# Patient Record
Sex: FEMALE | Race: BLACK OR AFRICAN AMERICAN | Employment: FULL TIME | URBAN - METROPOLITAN AREA
[De-identification: names, ages, dates, MRNs, and addresses within clinical notes are randomized per-mention and may not be internally consistent; named-entity substitution may affect disease eponyms.]

---

## 2017-02-04 RX ORDER — CETIRIZINE HYDROCHLORIDE 10 MG/1
TABLET ORAL
Qty: 90 TABLET | Refills: 0 | Status: SHIPPED | OUTPATIENT
Start: 2017-02-04 | End: 2017-05-11 | Stop reason: SDUPTHER

## 2017-08-15 DIAGNOSIS — J01.10 ACUTE FRONTAL SINUSITIS, RECURRENCE NOT SPECIFIED: ICD-10-CM

## 2017-08-15 RX ORDER — AZELASTINE 1 MG/ML
2 SPRAY, METERED NASAL 2 TIMES DAILY
Qty: 1 BOTTLE | Refills: 3 | Status: SHIPPED | OUTPATIENT
Start: 2017-08-15 | End: 2018-09-12 | Stop reason: SDUPTHER

## 2017-08-22 ENCOUNTER — TELEPHONE (OUTPATIENT)
Dept: PRIMARY CARE CLINIC | Age: 62
End: 2017-08-22

## 2017-08-24 ENCOUNTER — OFFICE VISIT (OUTPATIENT)
Dept: PRIMARY CARE CLINIC | Age: 62
End: 2017-08-24

## 2017-08-24 VITALS
BODY MASS INDEX: 50.02 KG/M2 | SYSTOLIC BLOOD PRESSURE: 158 MMHG | DIASTOLIC BLOOD PRESSURE: 81 MMHG | WEIGHT: 293 LBS | HEART RATE: 69 BPM | TEMPERATURE: 97.4 F | RESPIRATION RATE: 18 BRPM | HEIGHT: 64 IN | OXYGEN SATURATION: 97 %

## 2017-08-24 DIAGNOSIS — R51.9 ACUTE NONINTRACTABLE HEADACHE, UNSPECIFIED HEADACHE TYPE: ICD-10-CM

## 2017-08-24 DIAGNOSIS — R51.9 ACUTE NONINTRACTABLE HEADACHE, UNSPECIFIED HEADACHE TYPE: Primary | ICD-10-CM

## 2017-08-24 LAB
A/G RATIO: 1.3 (ref 1.1–2.2)
ALBUMIN SERPL-MCNC: 4.3 G/DL (ref 3.4–5)
ALP BLD-CCNC: 86 U/L (ref 40–129)
ALT SERPL-CCNC: 11 U/L (ref 10–40)
ANION GAP SERPL CALCULATED.3IONS-SCNC: 15 MMOL/L (ref 3–16)
AST SERPL-CCNC: 11 U/L (ref 15–37)
BILIRUB SERPL-MCNC: 0.3 MG/DL (ref 0–1)
BUN BLDV-MCNC: 12 MG/DL (ref 7–20)
CALCIUM SERPL-MCNC: 9.8 MG/DL (ref 8.3–10.6)
CHLORIDE BLD-SCNC: 103 MMOL/L (ref 99–110)
CO2: 25 MMOL/L (ref 21–32)
CREAT SERPL-MCNC: 0.7 MG/DL (ref 0.6–1.2)
GFR AFRICAN AMERICAN: >60
GFR NON-AFRICAN AMERICAN: >60
GLOBULIN: 3.2 G/DL
GLUCOSE BLD-MCNC: 124 MG/DL (ref 70–99)
HCT VFR BLD CALC: 42 % (ref 36–48)
HEMOGLOBIN: 13.5 G/DL (ref 12–16)
MCH RBC QN AUTO: 28.5 PG (ref 26–34)
MCHC RBC AUTO-ENTMCNC: 32.2 G/DL (ref 31–36)
MCV RBC AUTO: 88.5 FL (ref 80–100)
PDW BLD-RTO: 15.4 % (ref 12.4–15.4)
PLATELET # BLD: 277 K/UL (ref 135–450)
PMV BLD AUTO: 10.6 FL (ref 5–10.5)
POTASSIUM SERPL-SCNC: 3.8 MMOL/L (ref 3.5–5.1)
RBC # BLD: 4.75 M/UL (ref 4–5.2)
SODIUM BLD-SCNC: 143 MMOL/L (ref 136–145)
TOTAL PROTEIN: 7.5 G/DL (ref 6.4–8.2)
WBC # BLD: 10.7 K/UL (ref 4–11)

## 2017-08-24 PROCEDURE — 99213 OFFICE O/P EST LOW 20 MIN: CPT | Performed by: NURSE PRACTITIONER

## 2017-08-24 RX ORDER — BUTALBITAL, ACETAMINOPHEN AND CAFFEINE 50; 325; 40 MG/1; MG/1; MG/1
1 TABLET ORAL EVERY 6 HOURS PRN
Qty: 60 TABLET | Refills: 0 | Status: SHIPPED | OUTPATIENT
Start: 2017-08-24 | End: 2018-03-02

## 2017-08-24 RX ORDER — METHOCARBAMOL 750 MG/1
750 TABLET, FILM COATED ORAL 2 TIMES DAILY
Qty: 30 TABLET | Refills: 0 | Status: SHIPPED | OUTPATIENT
Start: 2017-08-24 | End: 2018-03-02

## 2017-08-24 ASSESSMENT — ENCOUNTER SYMPTOMS
EYE REDNESS: 0
EYE DISCHARGE: 0
CHEST TIGHTNESS: 0
EYE ITCHING: 0
SHORTNESS OF BREATH: 0
BACK PAIN: 0
BLOOD IN STOOL: 0
FACIAL SWELLING: 0
STRIDOR: 0
COLOR CHANGE: 0
DIARRHEA: 0
COUGH: 0
CONSTIPATION: 0
CHOKING: 0
TROUBLE SWALLOWING: 0
PHOTOPHOBIA: 1
ABDOMINAL DISTENTION: 0
NAUSEA: 0
RECTAL PAIN: 0
WHEEZING: 0
APNEA: 0
VOMITING: 0
SORE THROAT: 0
EYE PAIN: 0

## 2018-03-02 ENCOUNTER — OFFICE VISIT (OUTPATIENT)
Dept: PRIMARY CARE CLINIC | Age: 63
End: 2018-03-02

## 2018-03-02 VITALS
SYSTOLIC BLOOD PRESSURE: 128 MMHG | HEART RATE: 65 BPM | TEMPERATURE: 97.6 F | HEIGHT: 64 IN | BODY MASS INDEX: 50.02 KG/M2 | OXYGEN SATURATION: 97 % | RESPIRATION RATE: 18 BRPM | WEIGHT: 293 LBS | DIASTOLIC BLOOD PRESSURE: 80 MMHG

## 2018-03-02 DIAGNOSIS — R07.89 ATYPICAL CHEST PAIN: Primary | ICD-10-CM

## 2018-03-02 PROCEDURE — G8484 FLU IMMUNIZE NO ADMIN: HCPCS | Performed by: FAMILY MEDICINE

## 2018-03-02 PROCEDURE — 99213 OFFICE O/P EST LOW 20 MIN: CPT | Performed by: FAMILY MEDICINE

## 2018-03-02 PROCEDURE — 3017F COLORECTAL CA SCREEN DOC REV: CPT | Performed by: FAMILY MEDICINE

## 2018-03-02 PROCEDURE — 3014F SCREEN MAMMO DOC REV: CPT | Performed by: FAMILY MEDICINE

## 2018-03-02 PROCEDURE — G8417 CALC BMI ABV UP PARAM F/U: HCPCS | Performed by: FAMILY MEDICINE

## 2018-03-02 PROCEDURE — G8427 DOCREV CUR MEDS BY ELIG CLIN: HCPCS | Performed by: FAMILY MEDICINE

## 2018-03-02 PROCEDURE — 1036F TOBACCO NON-USER: CPT | Performed by: FAMILY MEDICINE

## 2018-03-02 ASSESSMENT — ENCOUNTER SYMPTOMS
COUGH: 0
RECTAL PAIN: 0
PHOTOPHOBIA: 0
CHOKING: 0
STRIDOR: 0
DIARRHEA: 0
COLOR CHANGE: 0
TROUBLE SWALLOWING: 0
EYE DISCHARGE: 0
EYE ITCHING: 0
SORE THROAT: 0
SINUS PRESSURE: 0
BLOOD IN STOOL: 0
SHORTNESS OF BREATH: 0
CONSTIPATION: 0
CHEST TIGHTNESS: 0
RHINORRHEA: 0
NAUSEA: 0
EYE PAIN: 0
EYE REDNESS: 0
ABDOMINAL DISTENTION: 0
BACK PAIN: 0
WHEEZING: 0
APNEA: 0
VOMITING: 0

## 2018-03-02 ASSESSMENT — PATIENT HEALTH QUESTIONNAIRE - PHQ9
SUM OF ALL RESPONSES TO PHQ QUESTIONS 1-9: 0
2. FEELING DOWN, DEPRESSED OR HOPELESS: 0
1. LITTLE INTEREST OR PLEASURE IN DOING THINGS: 0
SUM OF ALL RESPONSES TO PHQ9 QUESTIONS 1 & 2: 0

## 2018-04-09 ENCOUNTER — OFFICE VISIT (OUTPATIENT)
Dept: CARDIOLOGY CLINIC | Age: 63
End: 2018-04-09

## 2018-04-09 VITALS
WEIGHT: 293 LBS | SYSTOLIC BLOOD PRESSURE: 130 MMHG | HEART RATE: 81 BPM | HEIGHT: 64 IN | DIASTOLIC BLOOD PRESSURE: 80 MMHG | BODY MASS INDEX: 50.02 KG/M2

## 2018-04-09 DIAGNOSIS — I20.0 UNSTABLE ANGINA PECTORIS (HCC): ICD-10-CM

## 2018-04-09 DIAGNOSIS — I20.0 UNSTABLE ANGINA PECTORIS (HCC): Primary | ICD-10-CM

## 2018-04-09 LAB
A/G RATIO: 1.4 (ref 1.1–2.2)
ALBUMIN SERPL-MCNC: 4.2 G/DL (ref 3.4–5)
ALP BLD-CCNC: 83 U/L (ref 40–129)
ALT SERPL-CCNC: 13 U/L (ref 10–40)
ANION GAP SERPL CALCULATED.3IONS-SCNC: 14 MMOL/L (ref 3–16)
AST SERPL-CCNC: 14 U/L (ref 15–37)
BILIRUB SERPL-MCNC: 0.3 MG/DL (ref 0–1)
BUN BLDV-MCNC: 9 MG/DL (ref 7–20)
CALCIUM SERPL-MCNC: 9 MG/DL (ref 8.3–10.6)
CHLORIDE BLD-SCNC: 103 MMOL/L (ref 99–110)
CHOLESTEROL, TOTAL: 221 MG/DL (ref 0–199)
CO2: 25 MMOL/L (ref 21–32)
CREAT SERPL-MCNC: 0.7 MG/DL (ref 0.6–1.2)
GFR AFRICAN AMERICAN: >60
GFR NON-AFRICAN AMERICAN: >60
GLOBULIN: 3.1 G/DL
GLUCOSE BLD-MCNC: 94 MG/DL (ref 70–99)
HDLC SERPL-MCNC: 76 MG/DL (ref 40–60)
LDL CHOLESTEROL CALCULATED: 125 MG/DL
POTASSIUM SERPL-SCNC: 4.2 MMOL/L (ref 3.5–5.1)
SODIUM BLD-SCNC: 142 MMOL/L (ref 136–145)
TOTAL PROTEIN: 7.3 G/DL (ref 6.4–8.2)
TRIGL SERPL-MCNC: 98 MG/DL (ref 0–150)
VLDLC SERPL CALC-MCNC: 20 MG/DL

## 2018-04-09 PROCEDURE — 99204 OFFICE O/P NEW MOD 45 MIN: CPT | Performed by: INTERNAL MEDICINE

## 2018-05-03 ENCOUNTER — OFFICE VISIT (OUTPATIENT)
Dept: PRIMARY CARE CLINIC | Age: 63
End: 2018-05-03

## 2018-05-03 VITALS
WEIGHT: 293 LBS | HEART RATE: 88 BPM | SYSTOLIC BLOOD PRESSURE: 149 MMHG | TEMPERATURE: 97.7 F | HEIGHT: 64 IN | DIASTOLIC BLOOD PRESSURE: 80 MMHG | BODY MASS INDEX: 50.02 KG/M2 | OXYGEN SATURATION: 96 %

## 2018-05-03 DIAGNOSIS — J11.1 INFLUENZA: Primary | ICD-10-CM

## 2018-05-03 DIAGNOSIS — J11.1 INFLUENZA: ICD-10-CM

## 2018-05-03 LAB
BASOPHILS ABSOLUTE: 0 K/UL (ref 0–0.2)
BASOPHILS RELATIVE PERCENT: 0.4 %
EOSINOPHILS ABSOLUTE: 0.4 K/UL (ref 0–0.6)
EOSINOPHILS RELATIVE PERCENT: 3 %
HCT VFR BLD CALC: 41.5 % (ref 36–48)
HEMOGLOBIN: 13 G/DL (ref 12–16)
INFLUENZA A ANTIBODY: NORMAL
INFLUENZA B ANTIBODY: NORMAL
LYMPHOCYTES ABSOLUTE: 1.7 K/UL (ref 1–5.1)
LYMPHOCYTES RELATIVE PERCENT: 12.2 %
MCH RBC QN AUTO: 28.4 PG (ref 26–34)
MCHC RBC AUTO-ENTMCNC: 31.3 G/DL (ref 31–36)
MCV RBC AUTO: 91 FL (ref 80–100)
MONOCYTES ABSOLUTE: 0.9 K/UL (ref 0–1.3)
MONOCYTES RELATIVE PERCENT: 6.7 %
NEUTROPHILS ABSOLUTE: 10.8 K/UL (ref 1.7–7.7)
NEUTROPHILS RELATIVE PERCENT: 77.7 %
PDW BLD-RTO: 14.7 % (ref 12.4–15.4)
PLATELET # BLD: 258 K/UL (ref 135–450)
PMV BLD AUTO: 10.7 FL (ref 5–10.5)
RBC # BLD: 4.56 M/UL (ref 4–5.2)
WBC # BLD: 13.8 K/UL (ref 4–11)

## 2018-05-03 PROCEDURE — 87804 INFLUENZA ASSAY W/OPTIC: CPT | Performed by: INTERNAL MEDICINE

## 2018-05-03 PROCEDURE — 99213 OFFICE O/P EST LOW 20 MIN: CPT | Performed by: INTERNAL MEDICINE

## 2018-05-03 RX ORDER — AZITHROMYCIN 500 MG/1
TABLET, FILM COATED ORAL
Qty: 6 TABLET | Refills: 1 | Status: SHIPPED | OUTPATIENT
Start: 2018-05-03 | End: 2018-08-29

## 2018-05-03 RX ORDER — OSELTAMIVIR PHOSPHATE 75 MG/1
75 CAPSULE ORAL 2 TIMES DAILY
Qty: 10 CAPSULE | Refills: 0 | Status: SHIPPED | OUTPATIENT
Start: 2018-05-03 | End: 2018-05-08

## 2018-05-03 RX ORDER — GUAIFENESIN 100 MG/5ML
10 SYRUP ORAL EVERY 4 HOURS PRN
Qty: 1 BOTTLE | Refills: 1 | Status: SHIPPED | OUTPATIENT
Start: 2018-05-03 | End: 2018-08-29

## 2018-05-03 ASSESSMENT — ENCOUNTER SYMPTOMS
EYES NEGATIVE: 1
SORE THROAT: 1
FLU SYMPTOMS: 1
COUGH: 1
GASTROINTESTINAL NEGATIVE: 1

## 2018-05-09 ENCOUNTER — TELEPHONE (OUTPATIENT)
Dept: PRIMARY CARE CLINIC | Age: 63
End: 2018-05-09

## 2018-05-11 ENCOUNTER — OFFICE VISIT (OUTPATIENT)
Dept: PRIMARY CARE CLINIC | Age: 63
End: 2018-05-11

## 2018-05-11 VITALS
TEMPERATURE: 97.8 F | SYSTOLIC BLOOD PRESSURE: 110 MMHG | WEIGHT: 293 LBS | DIASTOLIC BLOOD PRESSURE: 59 MMHG | BODY MASS INDEX: 50.02 KG/M2 | HEART RATE: 74 BPM | OXYGEN SATURATION: 98 % | HEIGHT: 64 IN

## 2018-05-11 DIAGNOSIS — D72.829 LEUKOCYTOSIS, UNSPECIFIED TYPE: ICD-10-CM

## 2018-05-11 DIAGNOSIS — Z82.49 FAMILY HISTORY OF HEART DISEASE: ICD-10-CM

## 2018-05-11 DIAGNOSIS — R05.8 NON-PRODUCTIVE COUGH: Primary | ICD-10-CM

## 2018-05-11 PROCEDURE — 99213 OFFICE O/P EST LOW 20 MIN: CPT | Performed by: FAMILY MEDICINE

## 2018-05-11 RX ORDER — GUAIFENESIN 600 MG/1
600 TABLET, EXTENDED RELEASE ORAL 2 TIMES DAILY
Qty: 60 TABLET | Refills: 2 | Status: SHIPPED | OUTPATIENT
Start: 2018-05-11 | End: 2018-08-29

## 2018-05-11 ASSESSMENT — ENCOUNTER SYMPTOMS
WHEEZING: 0
NAUSEA: 0
STRIDOR: 0
SORE THROAT: 0
ABDOMINAL DISTENTION: 0
RECTAL PAIN: 0
CHEST TIGHTNESS: 0
EYE ITCHING: 0
COLOR CHANGE: 0
CHOKING: 0
PHOTOPHOBIA: 0
APNEA: 0
DIARRHEA: 0
BACK PAIN: 0
BLOOD IN STOOL: 0
EYE PAIN: 0
SHORTNESS OF BREATH: 0
COUGH: 0
SINUS PRESSURE: 0
CONSTIPATION: 0
EYE DISCHARGE: 0
VOMITING: 0
TROUBLE SWALLOWING: 0
RHINORRHEA: 0
EYE REDNESS: 0

## 2018-06-09 DIAGNOSIS — J30.2 CHRONIC SEASONAL ALLERGIC RHINITIS, UNSPECIFIED TRIGGER: Primary | ICD-10-CM

## 2018-06-11 RX ORDER — CETIRIZINE HYDROCHLORIDE 10 MG/1
TABLET ORAL
Qty: 90 TABLET | Refills: 5 | Status: SHIPPED | OUTPATIENT
Start: 2018-06-11 | End: 2018-06-12 | Stop reason: SDUPTHER

## 2018-06-12 DIAGNOSIS — J30.2 CHRONIC SEASONAL ALLERGIC RHINITIS, UNSPECIFIED TRIGGER: ICD-10-CM

## 2018-06-12 RX ORDER — CETIRIZINE HYDROCHLORIDE 10 MG/1
TABLET ORAL
Qty: 90 TABLET | Refills: 5 | Status: SHIPPED | OUTPATIENT
Start: 2018-06-12 | End: 2019-07-17

## 2018-06-12 RX ORDER — ATORVASTATIN CALCIUM 10 MG/1
10 TABLET, FILM COATED ORAL DAILY
Qty: 90 TABLET | Refills: 1 | Status: SHIPPED | OUTPATIENT
Start: 2018-06-12 | End: 2019-11-06 | Stop reason: SDUPTHER

## 2018-08-29 ENCOUNTER — OFFICE VISIT (OUTPATIENT)
Dept: PRIMARY CARE CLINIC | Age: 63
End: 2018-08-29

## 2018-08-29 VITALS
HEIGHT: 64 IN | BODY MASS INDEX: 50.02 KG/M2 | DIASTOLIC BLOOD PRESSURE: 76 MMHG | SYSTOLIC BLOOD PRESSURE: 130 MMHG | WEIGHT: 293 LBS | OXYGEN SATURATION: 96 % | TEMPERATURE: 97.5 F

## 2018-08-29 DIAGNOSIS — J31.0 NON-ALLERGIC RHINITIS: ICD-10-CM

## 2018-08-29 DIAGNOSIS — R53.83 FATIGUE, UNSPECIFIED TYPE: ICD-10-CM

## 2018-08-29 DIAGNOSIS — F51.01 PRIMARY INSOMNIA: Primary | ICD-10-CM

## 2018-08-29 PROBLEM — G47.33 OSA (OBSTRUCTIVE SLEEP APNEA): Status: ACTIVE | Noted: 2017-02-24

## 2018-08-29 PROBLEM — G47.10 HYPERSOMNOLENCE: Status: ACTIVE | Noted: 2017-02-24

## 2018-08-29 PROCEDURE — 99214 OFFICE O/P EST MOD 30 MIN: CPT | Performed by: FAMILY MEDICINE

## 2018-08-29 RX ORDER — CHLORPHENIRAMINE MALEATE 4 MG/1
4 TABLET ORAL EVERY 6 HOURS PRN
COMMUNITY
End: 2022-01-21

## 2018-08-29 RX ORDER — LANOLIN ALCOHOL/MO/W.PET/CERES
3 CREAM (GRAM) TOPICAL DAILY
Qty: 30 TABLET | Refills: 3 | Status: SHIPPED | OUTPATIENT
Start: 2018-08-29 | End: 2021-09-24

## 2018-08-29 ASSESSMENT — ENCOUNTER SYMPTOMS
CONSTIPATION: 0
STRIDOR: 0
PHOTOPHOBIA: 0
RHINORRHEA: 0
BACK PAIN: 0
SORE THROAT: 0
EYE DISCHARGE: 0
TROUBLE SWALLOWING: 0
SHORTNESS OF BREATH: 0
RECTAL PAIN: 0
COUGH: 0
EYE PAIN: 0
EYE REDNESS: 0
BLOOD IN STOOL: 0
SINUS PRESSURE: 0
DIARRHEA: 0
ABDOMINAL DISTENTION: 0
VOMITING: 0
CHEST TIGHTNESS: 0
NAUSEA: 0
EYE ITCHING: 0
COLOR CHANGE: 0
WHEEZING: 0
APNEA: 0
CHOKING: 0

## 2018-08-29 NOTE — PROGRESS NOTES
hearing loss, mouth sores, nosebleeds, postnasal drip, rhinorrhea, sinus pressure, sneezing, sore throat, tinnitus and trouble swallowing. Eyes: Negative for photophobia, pain, discharge, redness, itching and visual disturbance. Respiratory: Negative for apnea, cough, choking, chest tightness, shortness of breath, wheezing and stridor. Cardiovascular: Negative for chest pain, palpitations and leg swelling. Gastrointestinal: Negative for abdominal distention, blood in stool, constipation, diarrhea, nausea, rectal pain and vomiting. Genitourinary: Negative for decreased urine volume, difficulty urinating, dyspareunia, dysuria, enuresis, flank pain, frequency, genital sores, hematuria, menstrual problem, pelvic pain, urgency, vaginal bleeding and vaginal pain. Musculoskeletal: Negative for arthralgias, back pain, gait problem, joint swelling, myalgias, neck pain and neck stiffness. Skin: Negative for color change, pallor, rash and wound. Neurological: Negative for dizziness, tremors, seizures, syncope, facial asymmetry, speech difficulty, weakness, light-headedness, numbness and headaches. Hematological: Negative for adenopathy. Does not bruise/bleed easily. Psychiatric/Behavioral: Negative for agitation, behavioral problems, confusion, decreased concentration, dysphoric mood, hallucinations, self-injury, sleep disturbance and suicidal ideas. The patient is not nervous/anxious and is not hyperactive. Objective:   Physical Exam   Constitutional: She is oriented to person, place, and time. She appears well-developed and well-nourished. No distress. HENT:   Head: Normocephalic and atraumatic. Right Ear: External ear normal.   Left Ear: External ear normal.   Nose: Nose normal.   Mouth/Throat: Oropharynx is clear and moist. No oropharyngeal exudate. Eyes: Pupils are equal, round, and reactive to light. Conjunctivae and EOM are normal. Left eye exhibits no discharge. No scleral icterus.

## 2018-09-12 DIAGNOSIS — J01.10 ACUTE FRONTAL SINUSITIS, RECURRENCE NOT SPECIFIED: ICD-10-CM

## 2018-09-15 RX ORDER — AZELASTINE 1 MG/ML
2 SPRAY, METERED NASAL 2 TIMES DAILY
Qty: 1 BOTTLE | Refills: 3 | Status: SHIPPED | OUTPATIENT
Start: 2018-09-15 | End: 2019-02-23 | Stop reason: SDUPTHER

## 2018-10-16 ENCOUNTER — OFFICE VISIT (OUTPATIENT)
Dept: CARDIOLOGY CLINIC | Age: 63
End: 2018-10-16
Payer: COMMERCIAL

## 2018-10-16 VITALS
OXYGEN SATURATION: 98 % | SYSTOLIC BLOOD PRESSURE: 139 MMHG | HEART RATE: 63 BPM | BODY MASS INDEX: 50.02 KG/M2 | WEIGHT: 293 LBS | DIASTOLIC BLOOD PRESSURE: 60 MMHG | HEIGHT: 64 IN

## 2018-10-16 DIAGNOSIS — I20.0 UNSTABLE ANGINA PECTORIS (HCC): Primary | ICD-10-CM

## 2018-10-16 PROCEDURE — 99214 OFFICE O/P EST MOD 30 MIN: CPT | Performed by: INTERNAL MEDICINE

## 2019-02-23 DIAGNOSIS — J01.10 ACUTE FRONTAL SINUSITIS, RECURRENCE NOT SPECIFIED: ICD-10-CM

## 2019-02-25 RX ORDER — AZELASTINE 1 MG/ML
2 SPRAY, METERED NASAL 2 TIMES DAILY
Qty: 30 ML | Refills: 5 | Status: SHIPPED | OUTPATIENT
Start: 2019-02-25 | End: 2019-10-02 | Stop reason: SDUPTHER

## 2019-03-11 ENCOUNTER — OFFICE VISIT (OUTPATIENT)
Dept: CARDIOLOGY CLINIC | Age: 64
End: 2019-03-11
Payer: COMMERCIAL

## 2019-03-11 VITALS
WEIGHT: 293 LBS | HEART RATE: 68 BPM | BODY MASS INDEX: 50.33 KG/M2 | SYSTOLIC BLOOD PRESSURE: 102 MMHG | DIASTOLIC BLOOD PRESSURE: 70 MMHG

## 2019-03-11 DIAGNOSIS — I20.0 UNSTABLE ANGINA PECTORIS (HCC): Primary | ICD-10-CM

## 2019-03-11 DIAGNOSIS — I20.0 UNSTABLE ANGINA PECTORIS (HCC): ICD-10-CM

## 2019-03-11 PROCEDURE — 99214 OFFICE O/P EST MOD 30 MIN: CPT | Performed by: INTERNAL MEDICINE

## 2019-03-12 LAB
A/G RATIO: 1.4 (ref 1.1–2.2)
ALBUMIN SERPL-MCNC: 4.2 G/DL (ref 3.4–5)
ALP BLD-CCNC: 92 U/L (ref 40–129)
ALT SERPL-CCNC: 9 U/L (ref 10–40)
ANION GAP SERPL CALCULATED.3IONS-SCNC: 13 MMOL/L (ref 3–16)
AST SERPL-CCNC: 12 U/L (ref 15–37)
BILIRUB SERPL-MCNC: 0.5 MG/DL (ref 0–1)
BUN BLDV-MCNC: 9 MG/DL (ref 7–20)
CALCIUM SERPL-MCNC: 10.1 MG/DL (ref 8.3–10.6)
CHLORIDE BLD-SCNC: 105 MMOL/L (ref 99–110)
CHOLESTEROL, FASTING: 155 MG/DL (ref 0–199)
CO2: 26 MMOL/L (ref 21–32)
CREAT SERPL-MCNC: 0.7 MG/DL (ref 0.6–1.2)
GFR AFRICAN AMERICAN: >60
GFR NON-AFRICAN AMERICAN: >60
GLOBULIN: 3 G/DL
GLUCOSE FASTING: 92 MG/DL (ref 70–99)
HDLC SERPL-MCNC: 68 MG/DL (ref 40–60)
LDL CHOLESTEROL CALCULATED: 70 MG/DL
POTASSIUM SERPL-SCNC: 4.4 MMOL/L (ref 3.5–5.1)
SODIUM BLD-SCNC: 144 MMOL/L (ref 136–145)
TOTAL PROTEIN: 7.2 G/DL (ref 6.4–8.2)
TRIGLYCERIDE, FASTING: 86 MG/DL (ref 0–150)
VLDLC SERPL CALC-MCNC: 17 MG/DL

## 2019-03-19 DIAGNOSIS — Z23 NEED FOR SHINGLES VACCINE: Primary | ICD-10-CM

## 2019-07-16 DIAGNOSIS — J30.2 CHRONIC SEASONAL ALLERGIC RHINITIS: ICD-10-CM

## 2019-07-17 RX ORDER — CETIRIZINE HYDROCHLORIDE 10 MG/1
TABLET, FILM COATED ORAL
Qty: 90 TABLET | Refills: 5 | Status: SHIPPED | OUTPATIENT
Start: 2019-07-17 | End: 2020-08-12 | Stop reason: SDUPTHER

## 2019-10-02 DIAGNOSIS — J01.10 ACUTE FRONTAL SINUSITIS, RECURRENCE NOT SPECIFIED: ICD-10-CM

## 2019-10-02 RX ORDER — AZELASTINE HYDROCHLORIDE 137 UG/1
SPRAY, METERED NASAL
Qty: 90 ML | Refills: 2 | Status: SHIPPED | OUTPATIENT
Start: 2019-10-02 | End: 2020-04-16 | Stop reason: SDUPTHER

## 2019-11-07 RX ORDER — ATORVASTATIN CALCIUM 10 MG/1
TABLET, FILM COATED ORAL
Qty: 30 TABLET | Refills: 0 | Status: SHIPPED | OUTPATIENT
Start: 2019-11-07 | End: 2020-01-10 | Stop reason: SDUPTHER

## 2020-01-10 RX ORDER — ATORVASTATIN CALCIUM 10 MG/1
TABLET, FILM COATED ORAL
Qty: 90 TABLET | Refills: 1 | Status: SHIPPED | OUTPATIENT
Start: 2020-01-10 | End: 2020-11-24

## 2020-04-15 ENCOUNTER — TELEPHONE (OUTPATIENT)
Dept: PRIMARY CARE CLINIC | Age: 65
End: 2020-04-15

## 2020-04-16 ENCOUNTER — VIRTUAL VISIT (OUTPATIENT)
Dept: PRIMARY CARE CLINIC | Age: 65
End: 2020-04-16
Payer: MEDICARE

## 2020-04-16 VITALS
BODY MASS INDEX: 50.29 KG/M2 | WEIGHT: 293 LBS | DIASTOLIC BLOOD PRESSURE: 70 MMHG | HEART RATE: 68 BPM | SYSTOLIC BLOOD PRESSURE: 102 MMHG

## 2020-04-16 PROCEDURE — G8400 PT W/DXA NO RESULTS DOC: HCPCS | Performed by: FAMILY MEDICINE

## 2020-04-16 PROCEDURE — 4040F PNEUMOC VAC/ADMIN/RCVD: CPT | Performed by: FAMILY MEDICINE

## 2020-04-16 PROCEDURE — 1123F ACP DISCUSS/DSCN MKR DOCD: CPT | Performed by: FAMILY MEDICINE

## 2020-04-16 PROCEDURE — 1090F PRES/ABSN URINE INCON ASSESS: CPT | Performed by: FAMILY MEDICINE

## 2020-04-16 PROCEDURE — 99213 OFFICE O/P EST LOW 20 MIN: CPT | Performed by: FAMILY MEDICINE

## 2020-04-16 PROCEDURE — G8427 DOCREV CUR MEDS BY ELIG CLIN: HCPCS | Performed by: FAMILY MEDICINE

## 2020-04-16 PROCEDURE — 3017F COLORECTAL CA SCREEN DOC REV: CPT | Performed by: FAMILY MEDICINE

## 2020-04-16 RX ORDER — AZELASTINE HYDROCHLORIDE 137 UG/1
SPRAY, METERED NASAL
Qty: 90 ML | Refills: 2 | Status: SHIPPED | OUTPATIENT
Start: 2020-04-16 | End: 2020-08-06 | Stop reason: SDUPTHER

## 2020-04-16 ASSESSMENT — ENCOUNTER SYMPTOMS
CHOKING: 0
EYE ITCHING: 0
EYE DISCHARGE: 0
SINUS PRESSURE: 0
APNEA: 0
EYE PAIN: 0
SORE THROAT: 0
COLOR CHANGE: 0
BLOOD IN STOOL: 0
COUGH: 0
CONSTIPATION: 0
TROUBLE SWALLOWING: 0
NAUSEA: 0
SHORTNESS OF BREATH: 0
STRIDOR: 0
EYE REDNESS: 0
VOMITING: 0
PHOTOPHOBIA: 0
CHEST TIGHTNESS: 0
RHINORRHEA: 0
WHEEZING: 0
DIARRHEA: 0
BACK PAIN: 0
RECTAL PAIN: 0
ABDOMINAL DISTENTION: 0

## 2020-04-16 NOTE — PROGRESS NOTES
arthralgias, back pain, gait problem, joint swelling, myalgias, neck pain and neck stiffness. Skin: Negative for color change, pallor, rash and wound. Neurological: Negative for dizziness, tremors, seizures, syncope, facial asymmetry, speech difficulty, weakness, light-headedness, numbness and headaches. Hematological: Negative for adenopathy. Does not bruise/bleed easily. Psychiatric/Behavioral: Negative for agitation, behavioral problems, confusion, decreased concentration, dysphoric mood, hallucinations, self-injury, sleep disturbance and suicidal ideas. The patient is not nervous/anxious and is not hyperactive. Objective:   Physical Exam  Constitutional:       General: She is not in acute distress. Appearance: She is obese. She is not ill-appearing, toxic-appearing or diaphoretic. HENT:      Head: Normocephalic and atraumatic. Right Ear: Tympanic membrane and external ear normal.      Left Ear: Tympanic membrane and external ear normal.      Nose: Congestion and rhinorrhea present. Mouth/Throat:      Mouth: Mucous membranes are moist.   Eyes:      Extraocular Movements: Extraocular movements intact. Conjunctiva/sclera: Conjunctivae normal.      Pupils: Pupils are equal, round, and reactive to light. Neck:      Musculoskeletal: Normal range of motion. Pulmonary:      Effort: Pulmonary effort is normal. No respiratory distress. Breath sounds: No stridor. Neurological:      Mental Status: She is alert and oriented to person, place, and time. Psychiatric:         Mood and Affect: Mood normal.         Behavior: Behavior normal.         Thought Content:  Thought content normal.         Assessment:      .1. Seasonal allergic rhinitis, unspecified trigger  She has seasonal allergies  Discussed avoidance of allergiies  restart  - Azelastine HCl 137 MCG/SPRAY SOLN; 2 SPRAYS BY NASAL ROUTE 2 TIMES DAILY USE IN EACH NOSTRIL AS DIRECTED  Dispense: 90 mL; Refill: 2 Plan:              Miguel Trujillo MD

## 2020-08-06 ENCOUNTER — TELEPHONE (OUTPATIENT)
Dept: PRIMARY CARE CLINIC | Age: 65
End: 2020-08-06

## 2020-08-06 RX ORDER — AZELASTINE HYDROCHLORIDE 137 UG/1
SPRAY, METERED NASAL
Qty: 90 ML | Refills: 5 | Status: SHIPPED | OUTPATIENT
Start: 2020-08-06 | End: 2021-06-11 | Stop reason: ALTCHOICE

## 2020-08-12 ENCOUNTER — TELEPHONE (OUTPATIENT)
Dept: PRIMARY CARE CLINIC | Age: 65
End: 2020-08-12

## 2020-08-12 RX ORDER — CETIRIZINE HYDROCHLORIDE 10 MG/1
TABLET ORAL
Qty: 90 TABLET | Refills: 2 | Status: SHIPPED | OUTPATIENT
Start: 2020-08-12 | End: 2021-07-23

## 2020-11-03 ENCOUNTER — TELEPHONE (OUTPATIENT)
Dept: PRIMARY CARE CLINIC | Age: 65
End: 2020-11-03

## 2020-11-03 NOTE — TELEPHONE ENCOUNTER
Pt was exposed to a person with Covid. She was in the room socially distanced but did not have on a mask. This was one week ago Tuesday. The infected person tested yesterday and was notified today that he is positive. Our pt is asymptomatic   Please advise what she and her  should do.       PATIENT:  [phone]

## 2020-11-04 ENCOUNTER — OFFICE VISIT (OUTPATIENT)
Dept: PRIMARY CARE CLINIC | Age: 65
End: 2020-11-04
Payer: MEDICARE

## 2020-11-04 PROCEDURE — 99211 OFF/OP EST MAY X REQ PHY/QHP: CPT | Performed by: NURSE PRACTITIONER

## 2020-11-04 NOTE — PROGRESS NOTES
Richard Mckeon received a viral test for COVID-19. They were educated on isolation and quarantine as appropriate. For any symptoms, they were directed to seek care from their PCP, given contact information to establish with a doctor, directed to an urgent care or the emergency room.

## 2020-11-04 NOTE — TELEPHONE ENCOUNTER
Refer pt to [facility] thru testing facility  [phone]  Should quarantine for 14 days   Give VV with me also

## 2020-11-06 ENCOUNTER — VIRTUAL VISIT (OUTPATIENT)
Dept: PRIMARY CARE CLINIC | Age: 65
End: 2020-11-06
Payer: MEDICARE

## 2020-11-06 VITALS — WEIGHT: 293 LBS | HEIGHT: 64 IN | BODY MASS INDEX: 50.02 KG/M2

## 2020-11-06 LAB — SARS-COV-2, NAA: NOT DETECTED

## 2020-11-06 PROCEDURE — 1123F ACP DISCUSS/DSCN MKR DOCD: CPT | Performed by: FAMILY MEDICINE

## 2020-11-06 PROCEDURE — G8400 PT W/DXA NO RESULTS DOC: HCPCS | Performed by: FAMILY MEDICINE

## 2020-11-06 PROCEDURE — 4040F PNEUMOC VAC/ADMIN/RCVD: CPT | Performed by: FAMILY MEDICINE

## 2020-11-06 PROCEDURE — 1090F PRES/ABSN URINE INCON ASSESS: CPT | Performed by: FAMILY MEDICINE

## 2020-11-06 PROCEDURE — 99214 OFFICE O/P EST MOD 30 MIN: CPT | Performed by: FAMILY MEDICINE

## 2020-11-06 PROCEDURE — G8427 DOCREV CUR MEDS BY ELIG CLIN: HCPCS | Performed by: FAMILY MEDICINE

## 2020-11-06 PROCEDURE — 3017F COLORECTAL CA SCREEN DOC REV: CPT | Performed by: FAMILY MEDICINE

## 2020-11-06 RX ORDER — PNEUMOCOCCAL VACCINE POLYVALENT 25; 25; 25; 25; 25; 25; 25; 25; 25; 25; 25; 25; 25; 25; 25; 25; 25; 25; 25; 25; 25; 25; 25 UG/.5ML; UG/.5ML; UG/.5ML; UG/.5ML; UG/.5ML; UG/.5ML; UG/.5ML; UG/.5ML; UG/.5ML; UG/.5ML; UG/.5ML; UG/.5ML; UG/.5ML; UG/.5ML; UG/.5ML; UG/.5ML; UG/.5ML; UG/.5ML; UG/.5ML; UG/.5ML; UG/.5ML; UG/.5ML; UG/.5ML
0.5 INJECTION, SOLUTION INTRAMUSCULAR; SUBCUTANEOUS ONCE
Qty: 0.5 ML | Refills: 0 | Status: SHIPPED | OUTPATIENT
Start: 2020-11-06 | End: 2020-11-06

## 2020-11-06 ASSESSMENT — ENCOUNTER SYMPTOMS
NAUSEA: 0
EYE REDNESS: 0
CHOKING: 0
WHEEZING: 0
EYE PAIN: 0
DIARRHEA: 0
ABDOMINAL DISTENTION: 0
STRIDOR: 0
VOMITING: 0
CHEST TIGHTNESS: 0
RHINORRHEA: 0
CONSTIPATION: 0
EYE DISCHARGE: 0
COLOR CHANGE: 0
APNEA: 0
EYE ITCHING: 0
SINUS PRESSURE: 0
COUGH: 0
BLOOD IN STOOL: 0
PHOTOPHOBIA: 0
TROUBLE SWALLOWING: 0
SORE THROAT: 0
RECTAL PAIN: 0
BACK PAIN: 0
SHORTNESS OF BREATH: 0

## 2020-11-06 NOTE — PROGRESS NOTES
Subjective:      Jose Luis Muñoz is a 76 y.o. female being evaluated by a Virtual Visit (video visit) encounter to address concerns as mentioned above. A caregiver was present when appropriate. Due to this being a TeleHealth encounter (During [place] [address] public health emergency), evaluation of the following organ systems was limited: Vitals/Constitutional/EENT/Resp/CV/GI//MS/Neuro/Skin/Heme-Lymph-Imm. Pursuant to the emergency declaration under the [address] [place] [address] and the [employer] Act, this Virtual Visit was conducted with patient's (and/or legal guardian's) consent, to reduce the patient's risk of exposure to COVID-19 and provide necessary medical care. The patient (and/or legal guardian) has also been advised to contact this office for worsening conditions or problems, and seek emergency medical treatment and/or call 911 if deemed necessary. Patient identification was verified at the start of the visit: Yes    Total time spent for this encounter: 25 minutes    Services were provided through a video synchronous discussion virtually to substitute for in-person clinic visit. Patient and provider were located at their individual homes. --Alicia Rebolledo MD on 11/6/2020 at 10:11 AM    An electronic signature was used to authenticate this note. Patient ID: Jose Luis Muñoz is a 76 y.o. female. Concern for covid  HPI 75 y/o female known morbid obese, postmenopausal, exposed  To Covid pos individual at place of work Oct. 27  2020. Currently denies fever, cough,  Sob, no loss taste or smell, no gi sxs such as nausea or vomiting  Or diarrhea. She has had recent covid test but results are pending. Her  developed sxs fatigue, cough, loss of appetite, no sob, no GI  Sxs, no loss of taste or sense of smell,  But oonset  sxs one1 day ago  But today he feels some better.   Has had Headache for about a week    Weight gain  She is exercising less  Indoor activity, outdoor activity limited  Previous diet not helped so far  No h/o thyroid disease  No h/o heart disease  No diabetes  Has tried to limit caloric intake  Some what    No relieving factors   Aggravating factors  Indoor lack of exerise  Caloric intake  Not getting any better  Review of Systems   Constitutional: Negative for activity change, appetite change, chills, diaphoresis, fatigue and fever. HENT: Negative for congestion, dental problem, drooling, ear discharge, ear pain, hearing loss, mouth sores, nosebleeds, postnasal drip, rhinorrhea, sinus pressure, sneezing, sore throat, tinnitus and trouble swallowing. Eyes: Negative for photophobia, pain, discharge, redness, itching and visual disturbance. Respiratory: Negative for apnea, cough, choking, chest tightness, shortness of breath, wheezing and stridor. Cardiovascular: Negative for chest pain, palpitations and leg swelling. Gastrointestinal: Negative for abdominal distention, blood in stool, constipation, diarrhea, nausea, rectal pain and vomiting. Genitourinary: Negative for decreased urine volume, difficulty urinating, dyspareunia, dysuria, enuresis, flank pain, frequency, genital sores, hematuria, menstrual problem, pelvic pain, urgency, vaginal bleeding and vaginal pain. Musculoskeletal: Negative for arthralgias, back pain, gait problem, joint swelling, myalgias, neck pain and neck stiffness. Skin: Negative for color change, pallor, rash and wound. Neurological: Negative for dizziness, tremors, seizures, syncope, facial asymmetry, speech difficulty, weakness, light-headedness, numbness and headaches. Hematological: Negative for adenopathy. Does not bruise/bleed easily. Psychiatric/Behavioral: Negative for agitation, behavioral problems, confusion, decreased concentration, dysphoric mood, hallucinations, self-injury, sleep disturbance and suicidal ideas. The patient is not nervous/anxious and is not hyperactive. Objective:   Physical Exam  Constitutional:       General: She is not in acute distress. Appearance: Normal appearance. She is obese. She is not ill-appearing, toxic-appearing or diaphoretic. HENT:      Head: Normocephalic. Right Ear: External ear normal.      Left Ear: External ear normal.      Nose: Nose normal.   Eyes:      Extraocular Movements: Extraocular movements intact. Conjunctiva/sclera: Conjunctivae normal.      Pupils: Pupils are equal, round, and reactive to light. Neck:      Musculoskeletal: Normal range of motion. Pulmonary:      Effort: Pulmonary effort is normal.      Breath sounds: Normal breath sounds. Abdominal:      Comments: Central obesity   Musculoskeletal: Normal range of motion. Neurological:      Mental Status: She is alert and oriented to person, place, and time. Psychiatric:         Mood and Affect: Mood normal.         Behavior: Behavior normal.         Thought Content: Thought content normal.         Judgment: Judgment normal.         Assessment:        1. Morbidly obese (HCC)  Discussed diet exercise as tolerated    Check   - TSH without Reflex; Future, screen for diabetes  Mediterranean diet  Monitor caloric intake      2. COVID-19 virus test result unknown  Labs pending   pt and  will quarantine for 2 weeks     3. Postmenopausal  - DEXA BONE DENSITY AXIAL SKELETON; Future  - TSH without Reflex; Future    4. Encounter for mammogram to establish baseline mammogram    - WEI DIGITAL SCREEN W OR WO CAD BILATERAL; Future    5. Need for hepatitis C screening test    - HEPATITIS C ANTIBODY; Future    6. Screening for diabetes mellitus (DM)    - HEMOGLOBIN A1C; Future    7. Screening, lipid    - Lipid, Fasting; Future    8.  Need for vaccination for pneumococcus    - pneumococcal polyvalent (PNEUMOVAX 23) 25 MCG/0.5ML inj; Inject 0.5 mLs into the muscle once for 1 dose  Dispense: 0.5 mL; Refill: 0       Plan:      Flu vaccine at pharm    She will call in bp reading   Schedule for Ray Beaulieu MD

## 2020-11-19 DIAGNOSIS — Z11.59 NEED FOR HEPATITIS C SCREENING TEST: ICD-10-CM

## 2020-11-19 DIAGNOSIS — E66.01 MORBIDLY OBESE (HCC): ICD-10-CM

## 2020-11-19 DIAGNOSIS — Z78.0 POSTMENOPAUSAL: ICD-10-CM

## 2020-11-19 DIAGNOSIS — Z13.1 SCREENING FOR DIABETES MELLITUS (DM): ICD-10-CM

## 2020-11-19 DIAGNOSIS — Z13.220 SCREENING, LIPID: ICD-10-CM

## 2020-11-19 LAB
CHOLESTEROL, FASTING: 171 MG/DL (ref 0–199)
HDLC SERPL-MCNC: 63 MG/DL (ref 40–60)
HEPATITIS C ANTIBODY INTERPRETATION: NORMAL
LDL CHOLESTEROL CALCULATED: 94 MG/DL
TRIGLYCERIDE, FASTING: 71 MG/DL (ref 0–150)
TSH SERPL DL<=0.05 MIU/L-ACNC: 1.27 UIU/ML (ref 0.27–4.2)
VLDLC SERPL CALC-MCNC: 14 MG/DL

## 2020-11-20 LAB
ESTIMATED AVERAGE GLUCOSE: 119.8 MG/DL
HBA1C MFR BLD: 5.8 %

## 2020-11-23 NOTE — TELEPHONE ENCOUNTER
Medication:   Requested Prescriptions     Pending Prescriptions Disp Refills    atorvastatin (LIPITOR) 10 MG tablet [Pharmacy Med Name: Atorvastatin Calcium 10 MG Oral Tablet] 90 tablet 0     Sig: Take 1 tablet by mouth once daily        Last Filled:      Patient Phone Number: [phone] (home) [phone] (work)    Last appt: 11/6/2020   Next appt: Visit date not found    Last OARRS: No flowsheet data found.

## 2020-11-24 RX ORDER — ATORVASTATIN CALCIUM 10 MG/1
TABLET, FILM COATED ORAL
Qty: 90 TABLET | Refills: 0 | Status: SHIPPED | OUTPATIENT
Start: 2020-11-24 | End: 2021-04-26

## 2021-01-18 ENCOUNTER — OFFICE VISIT (OUTPATIENT)
Dept: PRIMARY CARE CLINIC | Age: 66
End: 2021-01-18
Payer: MEDICARE

## 2021-01-18 DIAGNOSIS — Z20.822 SUSPECTED COVID-19 VIRUS INFECTION: Primary | ICD-10-CM

## 2021-01-18 PROCEDURE — 99421 OL DIG E/M SVC 5-10 MIN: CPT | Performed by: NURSE PRACTITIONER

## 2021-01-18 NOTE — PROGRESS NOTES
Vanessa Hubbard received a viral test for COVID-19. They were educated on isolation and quarantine as appropriate. For any symptoms, they were directed to seek care from their PCP, given contact information to establish with a doctor, directed to an urgent care or the emergency room.

## 2021-01-19 LAB — SARS-COV-2, NAA: DETECTED

## 2021-01-24 ENCOUNTER — CARE COORDINATION (OUTPATIENT)
Dept: CARE COORDINATION | Age: 66
End: 2021-01-24

## 2021-01-24 NOTE — CARE COORDINATION
Yellow alert noted in [facility] remote symptom monitoring program. Messaged patient to notify Juli Dorsey if symptoms have worsened since yesterday. Michelle Kinney, MARIUSZ, 84:68 AM  Thank you for checking in with us. Please let us know if your symptoms are worse than they were yesterday or if you wish to speak to a nurse. We are available 8am - 4pm M-F, and 9am - 1pm S/S. Marylou Saeed

## 2021-01-25 ENCOUNTER — CARE COORDINATION (OUTPATIENT)
Dept: CARE COORDINATION | Age: 66
End: 2021-01-25

## 2021-01-25 NOTE — CARE COORDINATION
Yellow alert noted in [facility] remote symptom monitoring program. Messaged patient to notify Isra Muir if symptoms have worsened since yesterday. RN Response:    Kehinde, my name is Goldie Killian RN with the [facility] [address] [place] [address] Care Team. Thank you for reporting your symptoms. Do you feel that your symptoms have worsened today from yesterday? Make sure you are drinking plenty of water (fluids like Gatorade/Pedialyte), eating nutritious meals, and getting plenty of rest. You may want to try bland foods if you are having any stomach issues. Continue to quarantine at home if you are covid+ or awaiting test results. You may call me at [phone] if you need to speak with a nurse. If you do not have a primary care provider, you can call [phone] to schedule a virtual visit with one. We also have our [facility] ([facility]) Nurse Triage Line available 24/7 at: [phone]. Thank you, Alphonse Quintana.

## 2021-01-28 ENCOUNTER — TELEPHONE (OUTPATIENT)
Dept: PRIMARY CARE CLINIC | Age: 66
End: 2021-01-28

## 2021-01-28 NOTE — TELEPHONE ENCOUNTER
Pt and  have covid. Pt started with sinus congestion about 2 days ago, a lot of drainage and mucus turned yellow today. Highest temp today was 100.2, no headache, weak, shakes. Pt also has a cough. Would like something for the drainage and cough. Please advise. PHARMACY:  [facility] in [place].      PATIENT:   [phone]    :  [phone]

## 2021-01-29 DIAGNOSIS — J20.9 ACUTE BRONCHITIS, UNSPECIFIED ORGANISM: Primary | ICD-10-CM

## 2021-01-29 RX ORDER — MULTIVIT-MIN/IRON/FOLIC ACID/K 18-600-40
CAPSULE ORAL
Qty: 15 CAPSULE | Refills: 0 | Status: SHIPPED | OUTPATIENT
Start: 2021-01-29 | End: 2021-02-08 | Stop reason: SDUPTHER

## 2021-01-29 RX ORDER — AZITHROMYCIN 250 MG/1
TABLET, FILM COATED ORAL
Qty: 1 PACKET | Refills: 0 | Status: SHIPPED | OUTPATIENT
Start: 2021-01-29 | End: 2021-06-11 | Stop reason: ALTCHOICE

## 2021-01-29 RX ORDER — DEXAMETHASONE 6 MG/1
TABLET ORAL
Qty: 5 TABLET | Refills: 0 | Status: SHIPPED | OUTPATIENT
Start: 2021-01-29 | End: 2021-06-11 | Stop reason: ALTCHOICE

## 2021-01-29 RX ORDER — ZINC SULFATE 50(220)MG
220 CAPSULE ORAL DAILY
Qty: 15 CAPSULE | Refills: 0 | Status: SHIPPED | OUTPATIENT
Start: 2021-01-29 | End: 2021-06-11 | Stop reason: ALTCHOICE

## 2021-01-29 RX ORDER — BENZONATATE 100 MG/1
100 CAPSULE ORAL 3 TIMES DAILY PRN
Qty: 30 CAPSULE | Refills: 0 | Status: SHIPPED | OUTPATIENT
Start: 2021-01-29 | End: 2021-02-05

## 2021-02-08 RX ORDER — MULTIVIT-MIN/IRON/FOLIC ACID/K 18-600-40
CAPSULE ORAL
Qty: 15 CAPSULE | Refills: 0 | Status: SHIPPED | OUTPATIENT
Start: 2021-02-08 | End: 2021-09-24

## 2021-02-08 NOTE — TELEPHONE ENCOUNTER
Medication:   Requested Prescriptions     Pending Prescriptions Disp Refills    Ascorbic Acid (VITAMIN C) 500 MG CAPS 15 capsule 0     Sig: One tab a day for 15 days        Last Filled:      Patient Phone Number: [phone] (home) [phone] (work)    Last appt: 11/6/2020   Next appt: 5/7/2021    Last OARRS: No flowsheet data found.

## 2021-05-04 ENCOUNTER — TELEPHONE (OUTPATIENT)
Dept: PRIMARY CARE CLINIC | Age: 66
End: 2021-05-04

## 2021-05-07 ENCOUNTER — TELEPHONE (OUTPATIENT)
Dept: PRIMARY CARE CLINIC | Age: 66
End: 2021-05-07

## 2021-05-07 NOTE — TELEPHONE ENCOUNTER
Caller: Israel Cheatham  Pt's spouse cancelled their appointment today due to wanting to get b/w completed prior to being seen. I called the pt's  back and left v/m indicating that you wanted to see them prior to ordering b/w. Pt's spouse had requested a c/b. Thank you!    Pt trudi: [phone]

## 2021-05-10 DIAGNOSIS — D72.829 LEUKOCYTOSIS, UNSPECIFIED TYPE: Primary | ICD-10-CM

## 2021-05-10 DIAGNOSIS — Z13.220 SCREENING FOR HYPERLIPIDEMIA: ICD-10-CM

## 2021-06-07 DIAGNOSIS — D72.829 LEUKOCYTOSIS, UNSPECIFIED TYPE: ICD-10-CM

## 2021-06-07 DIAGNOSIS — Z13.220 SCREENING FOR HYPERLIPIDEMIA: ICD-10-CM

## 2021-06-08 LAB
CHOLESTEROL, FASTING: 169 MG/DL (ref 0–199)
HCT VFR BLD CALC: 40.6 % (ref 36–48)
HDLC SERPL-MCNC: 67 MG/DL (ref 40–60)
HEMOGLOBIN: 13.1 G/DL (ref 12–16)
LDL CHOLESTEROL CALCULATED: 84 MG/DL
MCH RBC QN AUTO: 29 PG (ref 26–34)
MCHC RBC AUTO-ENTMCNC: 32.2 G/DL (ref 31–36)
MCV RBC AUTO: 90 FL (ref 80–100)
PDW BLD-RTO: 15.2 % (ref 12.4–15.4)
PLATELET # BLD: 224 K/UL (ref 135–450)
PMV BLD AUTO: 10.9 FL (ref 5–10.5)
RBC # BLD: 4.51 M/UL (ref 4–5.2)
TRIGLYCERIDE, FASTING: 90 MG/DL (ref 0–150)
VLDLC SERPL CALC-MCNC: 18 MG/DL
WBC # BLD: 9.6 K/UL (ref 4–11)

## 2021-06-11 ENCOUNTER — OFFICE VISIT (OUTPATIENT)
Dept: PRIMARY CARE CLINIC | Age: 66
End: 2021-06-11
Payer: MEDICARE

## 2021-06-11 VITALS
SYSTOLIC BLOOD PRESSURE: 110 MMHG | TEMPERATURE: 97.9 F | HEART RATE: 68 BPM | OXYGEN SATURATION: 96 % | DIASTOLIC BLOOD PRESSURE: 75 MMHG | BODY MASS INDEX: 52.6 KG/M2 | WEIGHT: 293 LBS

## 2021-06-11 DIAGNOSIS — R73.03 PRE-DIABETES: ICD-10-CM

## 2021-06-11 DIAGNOSIS — E66.01 MORBIDLY OBESE (HCC): ICD-10-CM

## 2021-06-11 DIAGNOSIS — R73.03 PRE-DIABETES: Primary | ICD-10-CM

## 2021-06-11 PROCEDURE — 1036F TOBACCO NON-USER: CPT | Performed by: FAMILY MEDICINE

## 2021-06-11 PROCEDURE — G8427 DOCREV CUR MEDS BY ELIG CLIN: HCPCS | Performed by: FAMILY MEDICINE

## 2021-06-11 PROCEDURE — G8400 PT W/DXA NO RESULTS DOC: HCPCS | Performed by: FAMILY MEDICINE

## 2021-06-11 PROCEDURE — 1090F PRES/ABSN URINE INCON ASSESS: CPT | Performed by: FAMILY MEDICINE

## 2021-06-11 PROCEDURE — 4040F PNEUMOC VAC/ADMIN/RCVD: CPT | Performed by: FAMILY MEDICINE

## 2021-06-11 PROCEDURE — G8417 CALC BMI ABV UP PARAM F/U: HCPCS | Performed by: FAMILY MEDICINE

## 2021-06-11 PROCEDURE — 99213 OFFICE O/P EST LOW 20 MIN: CPT | Performed by: FAMILY MEDICINE

## 2021-06-11 PROCEDURE — 3017F COLORECTAL CA SCREEN DOC REV: CPT | Performed by: FAMILY MEDICINE

## 2021-06-11 PROCEDURE — 1123F ACP DISCUSS/DSCN MKR DOCD: CPT | Performed by: FAMILY MEDICINE

## 2021-06-11 SDOH — ECONOMIC STABILITY: FOOD INSECURITY: WITHIN THE PAST 12 MONTHS, YOU WORRIED THAT YOUR FOOD WOULD RUN OUT BEFORE YOU GOT MONEY TO BUY MORE.: NEVER TRUE

## 2021-06-11 SDOH — ECONOMIC STABILITY: FOOD INSECURITY: WITHIN THE PAST 12 MONTHS, THE FOOD YOU BOUGHT JUST DIDN'T LAST AND YOU DIDN'T HAVE MONEY TO GET MORE.: NEVER TRUE

## 2021-06-11 ASSESSMENT — PATIENT HEALTH QUESTIONNAIRE - PHQ9
SUM OF ALL RESPONSES TO PHQ QUESTIONS 1-9: 0
SUM OF ALL RESPONSES TO PHQ QUESTIONS 1-9: 0
SUM OF ALL RESPONSES TO PHQ9 QUESTIONS 1 & 2: 0
1. LITTLE INTEREST OR PLEASURE IN DOING THINGS: 0
2. FEELING DOWN, DEPRESSED OR HOPELESS: 0
SUM OF ALL RESPONSES TO PHQ QUESTIONS 1-9: 0

## 2021-06-11 ASSESSMENT — SOCIAL DETERMINANTS OF HEALTH (SDOH): HOW HARD IS IT FOR YOU TO PAY FOR THE VERY BASICS LIKE FOOD, HOUSING, MEDICAL CARE, AND HEATING?: NOT HARD AT ALL

## 2021-06-11 NOTE — PROGRESS NOTES
CC ck up discuss labs    HPI 77 y/o female known prediabetes and overweight who is fu  Visit today    She has no new c/o today    Last AIC 5,8    Wt Readings from Last 3 Encounters:   06/11/21 (!) 306 lb 6.6 oz (139 kg)   11/06/20 (!) 303 lb (137.4 kg)   04/16/20 293 lb (132.9 kg)         1. Pre-diabetes  Last AIC 5.8 at goal  < 7  Mediterranean diet  Exercise  ck  - HEMOGLOBIN A1C; Future    2.  Morbidly obese ([address]  Mediterranean diet

## 2021-06-12 LAB
ESTIMATED AVERAGE GLUCOSE: 111.2 MG/DL
HBA1C MFR BLD: 5.5 %

## 2021-07-23 DIAGNOSIS — J30.2 CHRONIC SEASONAL ALLERGIC RHINITIS: ICD-10-CM

## 2021-07-23 RX ORDER — CETIRIZINE HYDROCHLORIDE 10 MG/1
TABLET ORAL
Qty: 90 TABLET | Refills: 0 | Status: SHIPPED | OUTPATIENT
Start: 2021-07-23 | End: 2021-10-15 | Stop reason: SDUPTHER

## 2021-08-10 DIAGNOSIS — J30.2 SEASONAL ALLERGIC RHINITIS, UNSPECIFIED TRIGGER: ICD-10-CM

## 2021-08-10 RX ORDER — AZELASTINE HYDROCHLORIDE 137 UG/1
SPRAY, METERED NASAL
Qty: 90 ML | Refills: 11 | Status: SHIPPED | OUTPATIENT
Start: 2021-08-10 | End: 2022-08-30 | Stop reason: SDUPTHER

## 2021-08-24 ENCOUNTER — TELEPHONE (OUTPATIENT)
Dept: PULMONOLOGY | Age: 66
End: 2021-08-24

## 2021-08-24 NOTE — TELEPHONE ENCOUNTER
[address] [facility]  [address]  SUITE [facility] [place] [address]  Dept: [phone]  Loc: [phone]     Diagnosis:  [x] ISABELA (ICD-10: G47.33)  o CSA (ICD-10: G47.31)  [] Complex Sleep Apnea (ICD-10: G47.37)  []  (ICD-10: G47.37)  [] Hypoxemia (ICD-10: R09.02)  [] COPD (J44.90)  [] Chronic Respiratory Failure with hypoxemia (J96.11)  [] Chronicr Respiratory Failure with hypercapnia (J96.12)  [] Restrictive Lung Disease (J98.4)      [] New Rx (Device Preference: _________________________)     [] Change Order       [] Replace ___________    [] Discontinue Order -  [] CPAP    [] BPAP    [] PAP    [] Oxygen   /   AMA?  [] Yes   [] No              Therapy    AHI: ________ ONEIL: ________  LOW SpO2: ________%      DME:[facility]    DEVICE / SETTINGS RAMP / COMFORT INTERFACE   []  CPAP () Pressure    Ramp: _________ min's  [] Ramp to patient preference General PAP Supply Kit  Medicaid does not cover heated tubing  ([employer])  PAP Tubing:  [] Heated ()- 1/3 mos                         [] Regular () - 1/3 mos  [] Disposable Water Chamber () - 1/6 mos  [] Disposable Filter () - 2/mo  [] Non-Disposable Filter () - 1/6 mos   []  BiLevel PAP ()           IPAP        []  BiLevel PAP with   ()       Backup Rate ()      EPAP Rate  [] Adjust FLEX to patient comfort       SUPPLEMENTAL OXYGEN  [] OXYGEN:      Liter/min: _________  [] Continuous        [] Nocturnal  [] Bleed into PAP Device      []  [address] Kit    [x] Mask interface () - 1/3 mos  [x] Nasal Cushion () - 2/mo  [x] Nasal Pillows ()  -2/mo  [x] Headgear () - 1/6 mos   []  AutoBiLevel () Pressure  ()      Support           []  ResMed® IVAPS EPAP  [] Overnight Oximetry on Room Air  [] Overnight Oximetry on PAP Therapy    Target Pt Rate  Min PS      Target Va  Patient Ht  Ti Max                Ti Min        Rise time  Max PS  Trigger  Cycle  Epap  Epap max  Epap min  The patient has a history of:  [] Excessive Daytime Sleepiness  [] Insomnia  [] Impaired Cognition  [] Ischemic Heart Disease  [] Hypertension  [] Mood Disorders          [] History of Stroke  Additional Orders:______________________________________________________________________________________________________________________________________________________________________________     Full Face Mask Kit    [] Full face mask () - 1/3 mos  [] Full Face Cushion () - 1/mo  [] Headgear () - 1/6 mos                                           [] Respironics® ASV Advanced ()     EPAP Min  PS Min      EPAP Max  PS Max   Additional Supplies    [] Chin Strap ()  [] Heated Humidifier Kit ()  Mask Specifications:   [] Patient Preference      -or-            Brand:______________ Size:_______________   Type: __________________________________   [] Mask Refit:___________________________                                           Max Press  Ramp Time      Rate  Bi-flex: []1  []2  []3     [] Respironics® AVAPS: ()     IPAP Min  IPAP Max  Pressure Max  Epap max  Epap min  Rise time                      Avaps rate  EPAP   Additional Services    [] Annual PRN service and check of equipment  [] Routine service and check of equipment  [] Download and report compliance data   Tidal volume      Tigger                                     Rate                                         Inspiratory time                                                         The following equipment is Medically Necessary for the above stated patient. It is reasonable and necessary in reference to acceptable standards of medical practice for this condition, and is not prescribed as a convenience.            Frequency of Use:    Daily                 Length of Need: 13 Months              o The patient requires BiLevel PAP and the following apply: []  The patient requires a Respiratory Assist Device (RAD) and the following apply:   o CPAP was tried and failed to meet therapeutic goals. [] CPAP was tried, but failed to meet therapeutic goals   o The prescribed mask interface has been properly fit, is the most comfortable to the patient and will be used with the BPAP device. [] The prescribed mask interface has been properly fit, is the most comfortable to the patient and will be used with the RAD.   o Current CPAP setting prevents patient from tolerating the therapy and lower CPAP settings fail to adequately control the symptoms of ISABELA, improve sleep quality, or reduce AHI to acceptable levels. [] Current CPAP setting prevent patient from tolerating the therapy and lower PAP settings fail to  adequately control ISABELA symptoms, improve sleep quality, or reduce AHI to acceptable levels.          [] There is significant improvement of the respiratory events on the RAD                                                                                                                                                                                                                                  Shen Reich MD NPI- [id number]     [place]- [phone]                    08/24/21       ____________________________                        _______________________           Physician Signature                                                         Date

## 2021-08-24 NOTE — TELEPHONE ENCOUNTER
Patient called the office requesting an order for her nasal mask/pillows. She has not been seen at [facility]. Please advise.     DME: [facility]

## 2021-09-24 ENCOUNTER — HOSPITAL ENCOUNTER (OUTPATIENT)
Dept: GENERAL RADIOLOGY | Age: 66
Discharge: HOME | End: 2021-09-24
Payer: MEDICARE

## 2021-09-24 ENCOUNTER — HOSPITAL ENCOUNTER (OUTPATIENT)
Age: 66
Discharge: HOME | End: 2021-09-24
Payer: MEDICARE

## 2021-09-24 ENCOUNTER — OFFICE VISIT (OUTPATIENT)
Dept: CARDIOLOGY CLINIC | Age: 66
End: 2021-09-24
Payer: MEDICARE

## 2021-09-24 VITALS
HEART RATE: 73 BPM | HEIGHT: 64 IN | WEIGHT: 293 LBS | BODY MASS INDEX: 50.02 KG/M2 | SYSTOLIC BLOOD PRESSURE: 142 MMHG | DIASTOLIC BLOOD PRESSURE: 80 MMHG

## 2021-09-24 DIAGNOSIS — R07.9 CHEST PAIN, UNSPECIFIED TYPE: ICD-10-CM

## 2021-09-24 DIAGNOSIS — R07.9 CHEST PAIN, UNSPECIFIED TYPE: Primary | ICD-10-CM

## 2021-09-24 PROCEDURE — G8417 CALC BMI ABV UP PARAM F/U: HCPCS | Performed by: INTERNAL MEDICINE

## 2021-09-24 PROCEDURE — G8427 DOCREV CUR MEDS BY ELIG CLIN: HCPCS | Performed by: INTERNAL MEDICINE

## 2021-09-24 PROCEDURE — 71046 X-RAY EXAM CHEST 2 VIEWS: CPT

## 2021-09-24 PROCEDURE — 1123F ACP DISCUSS/DSCN MKR DOCD: CPT | Performed by: INTERNAL MEDICINE

## 2021-09-24 PROCEDURE — 99214 OFFICE O/P EST MOD 30 MIN: CPT | Performed by: INTERNAL MEDICINE

## 2021-09-24 PROCEDURE — 1036F TOBACCO NON-USER: CPT | Performed by: INTERNAL MEDICINE

## 2021-09-24 PROCEDURE — 1090F PRES/ABSN URINE INCON ASSESS: CPT | Performed by: INTERNAL MEDICINE

## 2021-09-24 PROCEDURE — 3017F COLORECTAL CA SCREEN DOC REV: CPT | Performed by: INTERNAL MEDICINE

## 2021-09-24 PROCEDURE — 93000 ELECTROCARDIOGRAM COMPLETE: CPT | Performed by: INTERNAL MEDICINE

## 2021-09-24 PROCEDURE — G8400 PT W/DXA NO RESULTS DOC: HCPCS | Performed by: INTERNAL MEDICINE

## 2021-09-24 PROCEDURE — 4040F PNEUMOC VAC/ADMIN/RCVD: CPT | Performed by: INTERNAL MEDICINE

## 2021-09-24 NOTE — PROGRESS NOTES
ArvinMeraldo Quiñones. Hipolito Aquino MD, [facility]    Outpatient Follow Up Note    9/24/2021,11:47 AM  Subjective:   CHIEF COMPLAINT / HPI:  Follow Up secondary to hypertension. Right bundle branch block. Britni Kruse is 78 y.o. female who presents today for a routine follow up. She is generally doing well. Has gained some weight of about 20 pounds during the pandemic era. States that her family notices that she seems more tired and more short of breath when she is climbing stairs or walking up a hill. Apparently was at the [employer] [facility] and had difficulty keeping up with friends. Denies physically having problems. No PND orthopnea and no chest pains. Coronavirus vaccine x2 without issues moderna  Coronavirus infection January 2021 but no pneumonia  Obstructive sleep apnea on CPAP           Past Medical History:    Past Medical History:   Diagnosis Date    Allergic rhinitis     Chronic back pain 3/23/2016    Hyperlipidemia     Leukocytosis     Obesity     Spondylosis of lumbar region without myelopathy or radiculopathy 3/23/2016     Past Surgical History  Past Surgical History:   Procedure Laterality Date    ANKLE SURGERY      right-broken bones    BREAST BIOPSY      benign   [facility], 1989    baby head too large, heart rate dropped    OTHER SURGICAL HISTORY  8/1996    sweat glands infected     Social History:       Social History     Tobacco Use   Smoking Status Never Smoker   Smokeless Tobacco Never Used     Current Medications:  Prior to Visit Medications    Medication Sig Taking?  Authorizing Provider   Azelastine HCl 137 MCG/SPRAY SOLN 2 SPRAYS BY NASAL ROUTE 2 TIMES DAILY Yes Brennan Rascon MD   cetirizine (ZYRTEC) 10 MG tablet TAKE 1 TABLET BY MOUTH ONCE DAILY Yes Brennan Rascon MD   atorvastatin (LIPITOR) 10 MG tablet Take 1 tablet by mouth daily Yes Brennan Rascon MD   chlorpheniramine (CHLOR-TRIMETON) 4 MG tablet Take 4 mg by mouth every 6 hours as needed for Allergies Yes Historical Provider, MD   fluticasone (FLONASE) 50 MCG/ACT nasal spray 1 spray by Nasal route daily Yes Lian Reyes MD   ipratropium (ATROVENT) 0.03 % nasal spray  Yes Historical Provider, MD     Family History  Family History   Problem Relation Age of Onset    Heart Disease Father     Rheum Arthritis Neg Hx     Osteoarthritis Neg Hx     Asthma Neg Hx     Breast Cancer Neg Hx     Cancer Neg Hx     Diabetes Neg Hx     Heart Failure Neg Hx     High Cholesterol Neg Hx     Hypertension Neg Hx     Migraines Neg Hx     Ovarian Cancer Neg Hx     Rashes/Skin Problems Neg Hx     Seizures Neg Hx     Stroke Neg Hx     Thyroid Disease Neg Hx        Current Medications  Current Outpatient Medications   Medication Sig Dispense Refill    Azelastine HCl 137 MCG/SPRAY SOLN 2 SPRAYS BY NASAL ROUTE 2 TIMES DAILY 90 mL 11    cetirizine (ZYRTEC) 10 MG tablet TAKE 1 TABLET BY MOUTH ONCE DAILY 90 tablet 0    atorvastatin (LIPITOR) 10 MG tablet Take 1 tablet by mouth daily 90 tablet 1    chlorpheniramine (CHLOR-TRIMETON) 4 MG tablet Take 4 mg by mouth every 6 hours as needed for Allergies      fluticasone (FLONASE) 50 MCG/ACT nasal spray 1 spray by Nasal route daily 1 Bottle 3    ipratropium (ATROVENT) 0.03 % nasal spray        No current facility-administered medications for this visit. REVIEW OF SYSTEMS:    CONSTITUTIONAL: No major weight gain or loss, fatigue, weakness, night sweats or fever. HEENT: No new vision difficulties or ringing in the ears. RESPIRATORY: No new SOB, PND, orthopnea or cough. CARDIOVASCULAR: See HPI  GI: No nausea, vomiting, diarrhea, constipation, abdominal pain or changes in bowel habits. : No urinary frequency, urgency, incontinence hematuria or dysuria. SKIN: No cyanosis or skin lesions. MUSCULOSKELETAL: No new muscle or joint pain. NEUROLOGICAL: No syncope or TIA-like symptoms.   PSYCHIATRIC: No anxiety, pain, insomnia or depression    Objective:   PHYSICAL EXAM:        VITALS:    Wt Readings from Last 3 Encounters:   09/24/21 (!) 312 lb (141.5 kg)   06/11/21 (!) 306 lb 6.6 oz (139 kg)   11/06/20 (!) 303 lb (137.4 kg)     BP Readings from Last 3 Encounters:   09/24/21 (!) 142/80   06/11/21 110/75   04/16/20 102/70     Pulse Readings from Last 3 Encounters:   09/24/21 73   06/11/21 68   04/16/20 68       CONSTITUTIONAL: Cooperative, no apparent distress, and appears well nourished / developed  NEUROLOGIC:  Awake and orientated to person, place and time. PSYCH: Calm affect. SKIN: Warm and dry. HEENT: Sclera non-icteric, normocephalic, neck supple, no elevation of JVP, normal carotid pulses with no bruits and thyroid normal size. LUNGS:  No increased work of breathing and clear to auscultation, no crackles or wheezing  CARDIOVASCULAR:  Regular rate and rhythm with no murmurs, gallops, rubs, or abnormal heart sounds, normal PMI. The apical impulses not displaced  Heart tones are crisp and normal  Cervical veins are not engorged  The carotid upstroke is normal in amplitude and contour without delay or bruit  JVP is not elevated  ABDOMEN:  Normal bowel sounds, non-distended and non-tender to palpation  EXT: No edema, no calf tenderness. Pulses are present bilaterally.     DATA:    Lab Results   Component Value Date    ALT 9 (L) 03/11/2019    AST 12 (L) 03/11/2019    ALKPHOS 92 03/11/2019    BILITOT 0.5 03/11/2019     Lab Results   Component Value Date    CREATININE 0.7 03/11/2019    BUN 9 03/11/2019     03/11/2019    K 4.4 03/11/2019     03/11/2019    CO2 26 03/11/2019     Lab Results   Component Value Date    TSH 1.27 11/19/2020     Lab Results   Component Value Date    WBC 9.6 06/07/2021    HGB 13.1 06/07/2021    HCT 40.6 06/07/2021    MCV 90.0 06/07/2021     06/07/2021     No components found for: CHLPL  Lab Results   Component Value Date    TRIG 98 04/09/2018    TRIG 87 09/10/2013    TRIG 121 08/30/2012     Lab Results   Component Value Date    HDL 67 (H) 06/07/2021    HDL 63 (H) 11/19/2020    HDL 68 (H) 03/11/2019     Lab Results   Component Value Date    LDLCALC 84 06/07/2021    LDLCALC 94 11/19/2020    LDLCALC 70 03/11/2019     Lab Results   Component Value Date    LABVLDL 18 06/07/2021    LABVLDL 14 11/19/2020    LABVLDL 17 03/11/2019     Radiology Review:  Pertinent images / reports were reviewed as a part of this visit and reveals the following:    Echo:    Stress Test / Angiogram:  IMPRESSION: 10/12/18            No significant abnormality       ECG: On 9/24/2021 sinus rhythm at 73/min. Right bundle branch block. Stable pattern. This patient was educated using the patient point room wall mount device. Absence from smokers and smoking and diet and exercising are important. Assessment:   Increasing shortness of breath and dyspnea with activity. At suspect most of this is related to her gain weight and poor conditioning status. I think this was made worse by the pandemic last year. Plan:     Recommend weight reduction activity. Reduce intake of calories and fluid. We will get a chest x-ray make sure she does not have any \"long hauler risk from her coronavirus infection. Return to see me in 4 months. Please call if we can assist further [phone]. Juventino Dash MD, [facility]      This note was likely completed using voice recognition technology and may contain unintended errors

## 2021-10-15 ENCOUNTER — OFFICE VISIT (OUTPATIENT)
Dept: PRIMARY CARE CLINIC | Age: 66
End: 2021-10-15
Payer: MEDICARE

## 2021-10-15 VITALS
BODY MASS INDEX: 54.93 KG/M2 | SYSTOLIC BLOOD PRESSURE: 157 MMHG | OXYGEN SATURATION: 99 % | DIASTOLIC BLOOD PRESSURE: 82 MMHG | HEART RATE: 73 BPM | WEIGHT: 293 LBS | TEMPERATURE: 97.5 F

## 2021-10-15 DIAGNOSIS — J30.2 CHRONIC SEASONAL ALLERGIC RHINITIS: ICD-10-CM

## 2021-10-15 DIAGNOSIS — M79.602 LEFT ARM PAIN: ICD-10-CM

## 2021-10-15 DIAGNOSIS — I10 PRIMARY HYPERTENSION: Primary | ICD-10-CM

## 2021-10-15 DIAGNOSIS — I10 PRIMARY HYPERTENSION: ICD-10-CM

## 2021-10-15 PROCEDURE — G8400 PT W/DXA NO RESULTS DOC: HCPCS | Performed by: FAMILY MEDICINE

## 2021-10-15 PROCEDURE — G8427 DOCREV CUR MEDS BY ELIG CLIN: HCPCS | Performed by: FAMILY MEDICINE

## 2021-10-15 PROCEDURE — G8484 FLU IMMUNIZE NO ADMIN: HCPCS | Performed by: FAMILY MEDICINE

## 2021-10-15 PROCEDURE — 4040F PNEUMOC VAC/ADMIN/RCVD: CPT | Performed by: FAMILY MEDICINE

## 2021-10-15 PROCEDURE — 1090F PRES/ABSN URINE INCON ASSESS: CPT | Performed by: FAMILY MEDICINE

## 2021-10-15 PROCEDURE — 3017F COLORECTAL CA SCREEN DOC REV: CPT | Performed by: FAMILY MEDICINE

## 2021-10-15 PROCEDURE — 1123F ACP DISCUSS/DSCN MKR DOCD: CPT | Performed by: FAMILY MEDICINE

## 2021-10-15 PROCEDURE — G8417 CALC BMI ABV UP PARAM F/U: HCPCS | Performed by: FAMILY MEDICINE

## 2021-10-15 PROCEDURE — 99214 OFFICE O/P EST MOD 30 MIN: CPT | Performed by: FAMILY MEDICINE

## 2021-10-15 PROCEDURE — 1036F TOBACCO NON-USER: CPT | Performed by: FAMILY MEDICINE

## 2021-10-15 RX ORDER — HYDRALAZINE HYDROCHLORIDE 25 MG/1
25 TABLET, FILM COATED ORAL 4 TIMES DAILY
Qty: 90 TABLET | Refills: 3 | Status: SHIPPED | OUTPATIENT
Start: 2021-10-15 | End: 2022-01-21

## 2021-10-15 RX ORDER — CETIRIZINE HYDROCHLORIDE 10 MG/1
TABLET ORAL
Qty: 90 TABLET | Refills: 0 | Status: SHIPPED | OUTPATIENT
Start: 2021-10-15 | End: 2022-02-07

## 2021-10-15 RX ORDER — ACETAMINOPHEN 500 MG
500 TABLET ORAL 4 TIMES DAILY PRN
Qty: 360 TABLET | Refills: 1 | Status: SHIPPED | OUTPATIENT
Start: 2021-10-15

## 2021-10-15 ASSESSMENT — ENCOUNTER SYMPTOMS
BLURRED VISION: 0
SHORTNESS OF BREATH: 0
ORTHOPNEA: 0

## 2021-10-15 NOTE — PROGRESS NOTES
CC fu for bp, left arm  akira    HPI 77 y/o female fu bp  Hypertension  This is a chronic problem. The current episode started more than 1 month ago. The problem is controlled. Pertinent negatives include no anxiety, blurred vision, chest pain, headaches, malaise/fatigue, neck pain, orthopnea, palpitations, peripheral edema, PND, shortness of breath or sweats. There are no associated agents to hypertension. Past treatments include nothing. The current treatment provides no improvement. There are no compliance problems. There is no history of angina, kidney disease, CAD/MI, CVA, heart failure, left ventricular hypertrophy, PVD or retinopathy. There is no history of chronic renal disease, coarctation of the aorta, hyperaldosteronism, hypercortisolism, hyperparathyroidism, a hypertension causing med, pheochromocytoma, renovascular disease, sleep apnea or a thyroid problem. Arm Pain   The pain is at a severity of 7/10. The pain is moderate. Pertinent negatives include no chest pain. She has tried nothing for the symptoms. The treatment provided no relief. Left forearm pain  She hpi    Allergic Rhinitis: Kan Banegas is here for evaluation of possible allergic rhinitis and conjunctivitis. Patient's symptoms include clear rhinorrhea, cough and itchy palate. These symptoms are perennial with seasonal exacerbation. Current triggers include exposure to no known precipitant. The patient has been suffering from these symptoms for approximately 3 years. The patient has tried prescription antihistamines with good relief of symptoms. Immunotherapy has never been tried. The patient has never had nasal polyps. The patient has no history of asthma. The patient does not suffer from frequent sinopulmonary infections. The patient has not had sinus surgery in the past. The patient has no history of eczema. Physical Exam  Constitutional:       General: She is not in acute distress.      Appearance: She is well-developed. She is not diaphoretic. HENT:      Head: Normocephalic and atraumatic. Eyes:      Pupils: Pupils are equal, round, and reactive to light. Neck:      Thyroid: No thyromegaly. Vascular: No JVD. Cardiovascular:      Rate and Rhythm: Normal rate and regular rhythm. Heart sounds: Normal heart sounds. No murmur heard. No gallop. Pulmonary:      Effort: No respiratory distress. Breath sounds: No stridor. No wheezing or rales. Abdominal:      General: Bowel sounds are normal.      Palpations: Abdomen is soft. Musculoskeletal:         General: Normal range of motion. Cervical back: Neck supple. Comments: No masses appreciated on exam of the arm     Skin:     General: Skin is warm and dry. Neurological:      Mental Status: She is alert and oriented to person, place, and time. Cranial Nerves: No cranial nerve deficit. Motor: No abnormal muscle tone. Coordination: Coordination normal.      Deep Tendon Reflexes: Reflexes are normal and symmetric. Reflexes normal.         1. Primary hypertension  bp not at goal  Start , and low salt diet, ck labs  - hydrALAZINE (APRESOLINE) 25 MG tablet; Take 1 tablet by mouth 4 times daily  Dispense: 90 tablet; Refill: 3  - Basic Metabolic Panel; Future    2. Left arm pain  Likely muscular skeletal   Add   - acetaminophen (TYLENOL) 500 MG tablet; Take 1 tablet by mouth 4 times daily as needed for Pain  Dispense: 360 tablet; Refill: 1    3. Chronic seasonal allergic rhinitis  Stable with anti histamine  - cetirizine (ZYRTEC) 10 MG tablet; TAKE 1 TABLET BY MOUTH ONCE DAILY  Dispense: 90 tablet;  Refill: 0

## 2021-10-20 ENCOUNTER — TELEPHONE (OUTPATIENT)
Dept: PULMONOLOGY | Age: 66
End: 2021-10-20

## 2021-10-20 NOTE — TELEPHONE ENCOUNTER
Patient would like to switch DME companies to [employer]. [facility] Han has not received the patient's order for supplies that has been faxed multiple times. Patient given fax number to [facility]. Patient aware that we can not send order for supplies and last office note without the sleep studies. Please watch for sleep studies.

## 2021-12-16 ENCOUNTER — OFFICE VISIT (OUTPATIENT)
Dept: PULMONOLOGY | Age: 66
End: 2021-12-16
Payer: MEDICARE

## 2021-12-16 VITALS
DIASTOLIC BLOOD PRESSURE: 78 MMHG | WEIGHT: 293 LBS | HEIGHT: 64 IN | RESPIRATION RATE: 18 BRPM | HEART RATE: 71 BPM | OXYGEN SATURATION: 100 % | TEMPERATURE: 97.7 F | BODY MASS INDEX: 50.02 KG/M2 | SYSTOLIC BLOOD PRESSURE: 156 MMHG

## 2021-12-16 DIAGNOSIS — G47.33 OBSTRUCTIVE SLEEP APNEA: Primary | ICD-10-CM

## 2021-12-16 DIAGNOSIS — E66.01 CLASS 3 SEVERE OBESITY WITHOUT SERIOUS COMORBIDITY WITH BODY MASS INDEX (BMI) OF 50.0 TO 59.9 IN ADULT, UNSPECIFIED OBESITY TYPE (HCC): ICD-10-CM

## 2021-12-16 DIAGNOSIS — G47.10 HYPERSOMNOLENCE: ICD-10-CM

## 2021-12-16 PROCEDURE — G8417 CALC BMI ABV UP PARAM F/U: HCPCS | Performed by: INTERNAL MEDICINE

## 2021-12-16 PROCEDURE — 1090F PRES/ABSN URINE INCON ASSESS: CPT | Performed by: INTERNAL MEDICINE

## 2021-12-16 PROCEDURE — 99213 OFFICE O/P EST LOW 20 MIN: CPT | Performed by: INTERNAL MEDICINE

## 2021-12-16 PROCEDURE — G8400 PT W/DXA NO RESULTS DOC: HCPCS | Performed by: INTERNAL MEDICINE

## 2021-12-16 PROCEDURE — 1036F TOBACCO NON-USER: CPT | Performed by: INTERNAL MEDICINE

## 2021-12-16 PROCEDURE — 1123F ACP DISCUSS/DSCN MKR DOCD: CPT | Performed by: INTERNAL MEDICINE

## 2021-12-16 PROCEDURE — G8484 FLU IMMUNIZE NO ADMIN: HCPCS | Performed by: INTERNAL MEDICINE

## 2021-12-16 PROCEDURE — 4040F PNEUMOC VAC/ADMIN/RCVD: CPT | Performed by: INTERNAL MEDICINE

## 2021-12-16 PROCEDURE — G8427 DOCREV CUR MEDS BY ELIG CLIN: HCPCS | Performed by: INTERNAL MEDICINE

## 2021-12-16 PROCEDURE — 3017F COLORECTAL CA SCREEN DOC REV: CPT | Performed by: INTERNAL MEDICINE

## 2021-12-16 ASSESSMENT — ENCOUNTER SYMPTOMS
GASTROINTESTINAL NEGATIVE: 1
ALLERGIC/IMMUNOLOGIC NEGATIVE: 1
RESPIRATORY NEGATIVE: 1
EYES NEGATIVE: 1

## 2021-12-16 NOTE — PATIENT INSTRUCTIONS
ASSESSMENT/PLAN:  1. Obstructive sleep apnea  2. Hypersomnolence  3. Class 3 severe obesity without serious comorbidity with body mass index (BMI) of 50.0 to 59.9 in adult, unspecified obesity type ([facility] [address]        Sleep study done 3/31/17 showed ahi of 36  with wt of 286     Weight loss and exercise are going to be important     Weight last year was 302 and then at 298 and them at 314 to 320    autopap for her  Set at max of 20 And min of 5  Using nasal pillows  Ramp time is 25 min and start at 4 cwp  epr is at 3 all the time        Using 30/30 days  Using 4.5 hr/night  90% pressure is 10.5  No leak at 13 lpm  No sleep apnea, ahi 0.5          dme is [facility]    Still don't have access to the [employer],this is now ongoing for 2 years  Finally got access via [employer]     Feeling the benefit of cpap use  Will continue    Will need supplies      covid vaccination +booster      RTC in 1 year    Remember to bring a list of pulmonary medications and any CPAP or BiPAP machines to your next appointment with the office. Please keep all of your future appointments scheduled by [facility] [address], [place] [facility] office. Out of respect for other patients and providers, you may be asked to reschedule your appointment if you arrive later than your scheduled appointment time. Appointments cancelled less than 24hrs in advance will be considered a no show. Patients with three missed appointments within 1 year or four missed appointments within 2 years can be dismissed from the practice. Please be aware that our physicians are required to work in the Intensive Care Unit at [facility].  Your appointment may need to be rescheduled if they are designated to work during your appointment time. You may receive a survey regarding the care you received during your visit. Your input is valuable to us. We encourage you to complete and return your survey.   We hope you will choose us in the future for your healthcare needs. Pt instructed of all future appointment dates & times, including radiology, labs, procedures & referrals. If procedures were scheduled preparation instructions provided. Instructions on future appointments with [facility] were given.

## 2021-12-16 NOTE — PROGRESS NOTES
Therapy    AHI: ________ ONEIL: ________  LOW SpO2: ________%      DME: [facility]    DEVICE / SETTINGS RAMP / COMFORT INTERFACE   []  CPAP () Pressure    Ramp: _________ min's  [] Ramp to patient preference General PAP Supply Kit  Medicaid does not cover heated tubing  ([employer])  PAP Tubing:  [x] Heated ()- 1/3 mos                         [x] Regular ()  1/3 mos  [x] Disposable Water Chamber () - 1/6 mos  [x] Disposable Filter () - 2/mo  [x] Non-Disposable Filter () - 1/6 mos   []  BiLevel PAP ()           IPAP        []  BiLevel PAP with   ()       Backup Rate ()      EPAP Rate  [] Adjust FLEX to patient comfort       SUPPLEMENTAL OXYGEN  [] OXYGEN:      Liter/min: _________  [] Continuous        [] Nocturnal  [] Bleed into PAP Device      []  [facility] Kit    [x] Mask interface () - 1/3 mos  [x] Nasal Cushion ()  2/mo  [x] Nasal Pillows ()  -2/mo  [x] Headgear ()  1/6 mos   []  AutoBiLevel () Pressure  ()      Support           []  ResMed® IVAPS EPAP  [] Overnight Oximetry on Room Air  [] Overnight Oximetry on PAP Therapy    Target Pt Rate  Min PS      Target Va  Patient Ht  Ti Max                Ti Min        Rise time  Max PS  Trigger  Cycle  Epap  Epap max  Epap min  The patient has a history of:  [] Excessive Daytime Sleepiness  [] Insomnia  [] Impaired Cognition  [] Ischemic Heart Disease  [] Hypertension  [] Mood Disorders          [] History of Stroke  Additional Orders:______________________________________________________________________________________________________________________________________________________________________________    [] Titrate to comfort Full Face Mask Kit    [] Full face mask ()  1/3 mos  [] Full Face Cushion ()  1/mo  [] Headgear ()  1/6 mos                                           [] Respironics® ASV Advanced ()     EPAP Min  PS Min      EPAP Max  PS Max   Additional Supplies    [] Aury Castillo ()  [] Heated Humidifier Kit ()  Mask Specifications:   [] Patient Preference      -or-            Brand:______________ Size:_______________   Type: __________________________________   [] Mask Refit:___________________________                                           Max Press  Ramp Time      Rate  Bi-flex: []1  []2  []3     [] Respironics® AVAPS: ()     IPAP Min  IPAP Max  Pressure Max  Epap max  Epap min  Rise time                      Avaps rate  EPAP   Additional Services    [] Annual PRN service and check of equipment  [] Routine service and check of equipment  [] Download and report compliance data   Tidal volume      Tigger                                     Rate                                         Inspiratory time                                                         The following equipment is Medically Necessary for the above stated patient. It is reasonable and necessary in reference to acceptable standards of medical practice for this condition, and is not prescribed as a convenience. Frequency of Use:    Daily                 Length of Need: 13 Months              o The patient requires BiLevel PAP and the following apply: []  The patient requires a Respiratory Assist Device (RAD) and the following apply:   o CPAP was tried and failed to meet therapeutic goals. [] CPAP was tried, but failed to meet therapeutic goals   o The prescribed mask interface has been properly fit, is the most comfortable to the patient and will be used with the BPAP device. [] The prescribed mask interface has been properly fit, is the most comfortable to the patient and will be used with the RAD.   o Current CPAP setting prevents patient from tolerating the therapy and lower CPAP settings fail to adequately control the symptoms of ISABELA, improve sleep quality, or reduce AHI to acceptable levels.  [] Current CPAP setting prevent patient from tolerating the therapy and lower PAP settings fail to  adequately control ISABELA symptoms, improve sleep quality, or reduce AHI to acceptable levels. [] There is significant improvement of the respiratory events on the RAD                                                                                                                                                                                                                                  Jane Felder MD               NPI- [id number]     Arnoldo Estrada [phone]                    12/16/21       ____________________________                        _______________________           Physician Signature                                                         Date                                                   Subjective   SUBJECTIVE/OBJECTIVE:  Transfer of care from [facility] to [facility]  Initially seen at [facility] pulmonary and sleep on 12/16/2021    Last seen at [facility] on 11/13/2020    Referred from Dr. Elizabeth Lee On 2/24/17     Sleeping better  Less irritable     No heaache  No bloating  No chest pain  No burping  No ear popping     Pressure feels good     Going to bed 5 am and waking up at 10-11 am    Sinus ok     Not sleeping  Can't get mask    Having issues mask        Review of Systems   Constitutional: Negative. HENT: Negative. Eyes: Negative. Respiratory: Negative. Cardiovascular: Negative. Gastrointestinal: Negative. Endocrine: Negative. Genitourinary: Negative. Musculoskeletal: Negative. Skin: Negative. Allergic/Immunologic: Negative. Neurological: Negative. Hematological: Negative. Psychiatric/Behavioral: Negative. Objective   Physical Exam  Vitals and nursing note reviewed. Constitutional:       General: She is not in acute distress. Appearance: Normal appearance. She is not ill-appearing. HENT:      Head: Normocephalic and atraumatic.       Right Ear: External ear

## 2021-12-16 NOTE — PROGRESS NOTES
MA Communication:   The following orders are received by verbal communication from Brandyn Valentine MD    Orders include:  Order faxed to [address] [place] [address]       1 year fu scheduled 12/15/22

## 2022-01-21 ENCOUNTER — OFFICE VISIT (OUTPATIENT)
Dept: PRIMARY CARE CLINIC | Age: 67
End: 2022-01-21
Payer: MEDICARE

## 2022-01-21 VITALS
SYSTOLIC BLOOD PRESSURE: 176 MMHG | DIASTOLIC BLOOD PRESSURE: 80 MMHG | BODY MASS INDEX: 55.61 KG/M2 | HEART RATE: 72 BPM | WEIGHT: 293 LBS | TEMPERATURE: 97.2 F

## 2022-01-21 DIAGNOSIS — Z78.0 POSTMENOPAUSE: ICD-10-CM

## 2022-01-21 DIAGNOSIS — Z23 NEED FOR TDAP VACCINATION: ICD-10-CM

## 2022-01-21 DIAGNOSIS — Z12.31 ENCOUNTER FOR MAMMOGRAM TO ESTABLISH BASELINE MAMMOGRAM: ICD-10-CM

## 2022-01-21 DIAGNOSIS — Z23 NEED FOR VACCINATION FOR PNEUMOCOCCUS: ICD-10-CM

## 2022-01-21 DIAGNOSIS — I10 PRIMARY HYPERTENSION: Primary | ICD-10-CM

## 2022-01-21 PROCEDURE — G8400 PT W/DXA NO RESULTS DOC: HCPCS | Performed by: FAMILY MEDICINE

## 2022-01-21 PROCEDURE — G8417 CALC BMI ABV UP PARAM F/U: HCPCS | Performed by: FAMILY MEDICINE

## 2022-01-21 PROCEDURE — 1123F ACP DISCUSS/DSCN MKR DOCD: CPT | Performed by: FAMILY MEDICINE

## 2022-01-21 PROCEDURE — 4040F PNEUMOC VAC/ADMIN/RCVD: CPT | Performed by: FAMILY MEDICINE

## 2022-01-21 PROCEDURE — 1036F TOBACCO NON-USER: CPT | Performed by: FAMILY MEDICINE

## 2022-01-21 PROCEDURE — 99214 OFFICE O/P EST MOD 30 MIN: CPT | Performed by: FAMILY MEDICINE

## 2022-01-21 PROCEDURE — 1090F PRES/ABSN URINE INCON ASSESS: CPT | Performed by: FAMILY MEDICINE

## 2022-01-21 PROCEDURE — G8427 DOCREV CUR MEDS BY ELIG CLIN: HCPCS | Performed by: FAMILY MEDICINE

## 2022-01-21 PROCEDURE — 3017F COLORECTAL CA SCREEN DOC REV: CPT | Performed by: FAMILY MEDICINE

## 2022-01-21 PROCEDURE — G8484 FLU IMMUNIZE NO ADMIN: HCPCS | Performed by: FAMILY MEDICINE

## 2022-01-21 RX ORDER — HYDROCHLOROTHIAZIDE 25 MG/1
25 TABLET ORAL DAILY
Qty: 90 TABLET | Refills: 1 | Status: SHIPPED | OUTPATIENT
Start: 2022-01-21

## 2022-01-21 RX ORDER — PNEUMOCOCCAL VACCINE POLYVALENT 25; 25; 25; 25; 25; 25; 25; 25; 25; 25; 25; 25; 25; 25; 25; 25; 25; 25; 25; 25; 25; 25; 25 UG/.5ML; UG/.5ML; UG/.5ML; UG/.5ML; UG/.5ML; UG/.5ML; UG/.5ML; UG/.5ML; UG/.5ML; UG/.5ML; UG/.5ML; UG/.5ML; UG/.5ML; UG/.5ML; UG/.5ML; UG/.5ML; UG/.5ML; UG/.5ML; UG/.5ML; UG/.5ML; UG/.5ML; UG/.5ML; UG/.5ML
0.5 INJECTION, SOLUTION INTRAMUSCULAR; SUBCUTANEOUS ONCE
Qty: 0.5 ML | Refills: 0 | Status: SHIPPED | OUTPATIENT
Start: 2022-01-21 | End: 2022-01-21
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
## 2022-01-21 NOTE — PATIENT INSTRUCTIONS
meals  · Cut back on the amount of salt you use in cooking. This will help you adjust to the taste. Do not add salt after cooking. One teaspoon of salt has about 2,300 mg of sodium. · Take the salt shaker off the table. · Flavor your food with garlic, lemon juice, onion, vinegar, herbs, and spices. Do not use soy sauce, lite soy sauce, steak sauce, onion salt, garlic salt, celery salt, or ketchup on your food. · Use low-sodium salad dressings, sauces, and ketchup. Or make your own salad dressings and sauces without adding salt. · Use less salt (or none) when recipes call for it. You can often use half the salt a recipe calls for without losing flavor. Other foods such as rice, pasta, and grains do not need added salt. · Rinse canned vegetables, and cook them in fresh water. This removes some--but not all--of the salt. · Avoid water that is naturally high in sodium or that has been treated with water softeners, which add sodium. If you buy bottled water, read the label and choose a sodium-free brand. Avoid high-sodium foods  · Avoid eating:  ? Smoked, cured, salted, and canned meat, fish, and poultry. ? Ham, posada, hot dogs, and luncheon meats. ? Regular, hard, and processed cheese and regular peanut butter. ? Crackers with salted tops, and other salted snack foods such as pretzels, chips, and salted popcorn. ? Frozen prepared meals, unless labeled low-sodium. ? Canned and dried soups, broths, and bouillon, unless labeled sodium-free or low-sodium. ? Canned vegetables, unless labeled sodium-free or low-sodium. ? Western Eduarda fries, pizza, tacos, and other fast foods. ? Pickles, olives, ketchup, and other condiments, especially soy sauce, unless labeled sodium-free or low-sodium. Where can you learn more? Go to https://bradley.[email or web address] org and sign in to your [employer] account.  Enter D810 in the [facility] box to learn more about \"Low Sodium Diet (2,000 Milligram): Care Instructions. \"     If you do not have an account, please click on the \"Sign Up Now\" link. Current as of: September 8, 2021               Content Version: 13.1  © 2006-2021 Healthwise, Incorporated. Care instructions adapted under license by [facility] ([facility]). If you have questions about a medical condition or this instruction, always ask your healthcare professional. [address] any warranty or liability for your use of this information. Patient Education        Learning About Low-Sodium Foods  What foods are low in sodium? The foods you eat contain nutrients, such as vitamins and minerals. Sodium is a nutrient. Your body needs the right amount to stay healthy and work as it should. You can use the list below to help you make choices about which foods to eat. Fruits  · Fresh, frozen, canned, or dried fruit  Vegetables  · Fresh or frozen vegetables, with no added salt  · Canned vegetables, low-sodium or with no added salt  Grains  · Bagels without salted tops  · Cereal with no added salt  · Corn tortillas  · Crackers with no added salt  · Oatmeal, cooked without salt  · Popcorn with no salt  · Pasta and noodles, cooked without salt  · Rice, cooked without salt  · Unsalted pretzels  Dairy and dairy alternatives  · Butter, unsalted  · Cream cheese  · Ice cream  · Milk  · Soy milk  Meats and other protein foods  · Beans and peas, canned with no salt  · Eggs  · Fresh fish (not smoked)  · Fresh meats (not smoked or cured)  · Nuts and nut butter, prepared without salt  · Poultry, not packaged with sodium solution  · Tofu, unseasoned  · Tuna, canned without salt  Seasonings  · Garlic  · Herbs and spices  · Lemon juice  · Mustard  · Olive oil  · Salt-free seasoning mixes  · Vinegar  Work with your doctor to find out how much of this nutrient you need. Depending on your health, you may need more or less of it in your diet. Where can you learn more?   Go to [email or web address].[email or web address] [email or web address] and sign in to your [employer] account. Enter M882 in the [facility] box to learn more about \"Learning About Low-Sodium Foods. \"     If you do not have an account, please click on the \"Sign Up Now\" link. Current as of: September 8, 2021               Content Version: 13.1  © 2006-2021 [employer]. Care instructions adapted under license by [facility] ([facility]). If you have questions about a medical condition or this instruction, always ask your healthcare professional. [place] [address] any warranty or liability for your use of this information. Patient Education        How to Read a Food Label to Limit Sodium: Care Instructions  Overview  Limiting sodium can be an important part of managing some health problems. Processed foods, fast food, and restaurant foods are the major sources of dietary sodium. The most common name for sodium is salt. Most packaged foods have a Nutrition Facts label. This will tell you how much sodium is in one serving of food. Follow-up care is a key part of your treatment and safety. Be sure to make and go to all appointments, and call your doctor if you are having problems. It's also a good idea to know your test results and keep a list of the medicines you take. How can you care for yourself at home? Read ingredient lists on food labels  · Read the list of ingredients on food labels to help you find how much sodium is in a food. The label lists the ingredients in a food in descending order (from the most to the least). If salt or sodium is high on the list, there may be a lot of sodium in the food. · Know that sodium has different names. Sodium is also called monosodium glutamate (MSG), sodium citrate, sodium alginate, and sodium phosphate. Read Nutrition Facts labels  · On most foods, there is a Nutrition Facts label. This will tell you how much sodium is in one serving of food.  Look at both the serving size and the sodium amount. The serving size is located at the top of the label, usually right under the \"Nutrition Facts\" title. The amount of sodium is given in the list under the title. It is given in milligrams (mg). ? Check the serving size carefully. A single serving is often very small, and you may eat more than one serving. If this is the case, you will eat more sodium than listed on the label. For example, if the serving size for a canned soup is 1 cup and the sodium amount is 470 mg, if you have 2 cups you will eat 940 mg of sodium. · The nutrition facts for fresh fruits and vegetables are not listed on the food. They may be listed somewhere in the store. These foods usually have no sodium or low sodium. · The Nutrition Facts label also gives you the Percent Daily Value for sodium. This is how much of the recommended amount of sodium a serving contains. The daily value for sodium is 2,300 mg. So if the Percent Daily Value says 50%, this means one serving is giving you half of this, or 1,150 mg. Buy low-sodium foods  · Look for foods that are made with less sodium. Watch for the following words on the label. ? \"Unsalted\" means there is no sodium added to the food. But there may be sodium already in the food naturally. ? \"Sodium-free\" means a serving has less than 5 mg of sodium. ? \"Very low sodium\" means a serving has 35 mg or less of sodium. ? \"Low-sodium\" means a serving has 140 mg or less of sodium. · \"Reduced-sodium\" means that there is 25% less sodium than what the food normally has. This is still usually too much sodium. · Buy fresh vegetables, or frozen vegetables without added sauces. Buy low-sodium versions of canned vegetables, soups, and other canned goods. Where can you learn more? Go to https://bradley.ab&jb properties and services. org and sign in to your [employer] account.  Enter [id number] in the [facility] box to learn more about \"How to Read a Food Label to Limit Sodium: Care Instructions. \"     If you do not have an account, please click on the \"Sign Up Now\" link. Current as of: September 8, 2021               Content Version: 13.1  © 2006-2021 Healthwise, Incorporated. Care instructions adapted under license by [facility] ([facility]). If you have questions about a medical condition or this instruction, always ask your healthcare professional. [address] any warranty or liability for your use of this information. Patient Education        DASH Diet: Care Instructions  Your Care Instructions     The DASH diet is an eating plan that can help lower your blood pressure. DASH stands for Dietary Approaches to Stop Hypertension. Hypertension is high blood pressure. The DASH diet focuses on eating foods that are high in calcium, potassium, and magnesium. These nutrients can lower blood pressure. The foods that are highest in these nutrients are fruits, vegetables, low-fat dairy products, nuts, seeds, and legumes. But taking calcium, potassium, and magnesium supplements instead of eating foods that are high in those nutrients does not have the same effect. The DASH diet also includes whole grains, fish, and poultry. The DASH diet is one of several lifestyle changes your doctor may recommend to lower your high blood pressure. Your doctor may also want you to decrease the amount of sodium in your diet. Lowering sodium while following the DASH diet can lower blood pressure even further than just the DASH diet alone. Follow-up care is a key part of your treatment and safety. Be sure to make and go to all appointments, and call your doctor if you are having problems. It's also a good idea to know your test results and keep a list of the medicines you take. How can you care for yourself at home? Following the DASH diet  · Eat 4 to 5 servings of fruit each day.  A serving is 1 medium-sized piece of fruit, ½ cup chopped or canned fruit, 1/4 cup dried fruit, or 4 ounces (½ cup) of fruit juice. Choose fruit more often than fruit juice. · Eat 4 to 5 servings of vegetables each day. A serving is 1 cup of lettuce or raw leafy vegetables, ½ cup of chopped or cooked vegetables, or 4 ounces (½ cup) of vegetable juice. Choose vegetables more often than vegetable juice. · Get 2 to 3 servings of low-fat and fat-free dairy each day. A serving is 8 ounces of milk, 1 cup of yogurt, or 1 ½ ounces of cheese. · Eat 6 to 8 servings of grains each day. A serving is 1 slice of bread, 1 ounce of dry cereal, or ½ cup of cooked rice, pasta, or cooked cereal. Try to choose whole-grain products as much as possible. · Limit lean meat, poultry, and fish to 2 servings each day. A serving is 3 ounces, about the size of a deck of cards. · Eat 4 to 5 servings of nuts, seeds, and legumes (cooked dried beans, lentils, and split peas) each week. A serving is 1/3 cup of nuts, 2 tablespoons of seeds, or ½ cup of cooked beans or peas. · Limit fats and oils to 2 to 3 servings each day. A serving is 1 teaspoon of vegetable oil or 2 tablespoons of salad dressing. · Limit sweets and added sugars to 5 servings or less a week. A serving is 1 tablespoon jelly or jam, ½ cup sorbet, or 1 cup of lemonade. · Eat less than 2,300 milligrams (mg) of sodium a day. If you limit your sodium to 1,500 mg a day, you can lower your blood pressure even more. · Be aware that all of these are the suggested number of servings for people who eat 1,800 to 2,000 calories a day. Your recommended number of servings may be different if you need more or fewer calories. Tips for success  · Start small. Do not try to make dramatic changes to your diet all at once. You might feel that you are missing out on your favorite foods and then be more likely to not follow the plan. Make small changes, and stick with them. Once those changes become habit, add a few more changes. · Try some of the following:  ?  Make it a goal to eat a fruit or vegetable at every meal and at snacks. This will make it easy to get the recommended amount of fruits and vegetables each day. ? Try yogurt topped with fruit and nuts for a snack or healthy dessert. ? Add lettuce, tomato, cucumber, and onion to sandwiches. ? Combine a ready-made pizza crust with low-fat mozzarella cheese and lots of vegetable toppings. Try using tomatoes, squash, spinach, broccoli, carrots, cauliflower, and onions. ? Have a variety of cut-up vegetables with a low-fat dip as an appetizer instead of chips and dip. ? Sprinkle sunflower seeds or chopped almonds over salads. Or try adding chopped walnuts or almonds to cooked vegetables. ? Try some vegetarian meals using beans and peas. Add garbanzo or kidney beans to salads. Make burritos and tacos with mashed beauchamp beans or black beans. Where can you learn more? Go to https://CineCoup.BioNitrogen. org and sign in to your [employer] account. Enter O331 in the [employer] box to learn more about \"DASH Diet: Care Instructions. \"     If you do not have an account, please click on the \"Sign Up Now\" link. Current as of: April 29, 2021               Content Version: 13.1  © 0232-0086 Healthwise, Incorporated. Care instructions adapted under license by [facility] ([facility]). If you have questions about a medical condition or this instruction, always ask your healthcare professional. [place] [address] any warranty or liability for your use of this information. Patient Education        Learning About the Mediterranean Diet  What is the [address]? The Mediterranean diet is a style of eating rather than a diet plan. It features foods eaten in [place], [place], [place], and other countries along the [facility]. It emphasizes eating foods like fish, fruits, vegetables, beans, high-fiber breads and whole grains, nuts, and olive oil.  This style of eating includes limited red meat, cheese, and sweets. Why choose the Mediterranean diet? A Mediterranean-style diet may improve heart health. It contains more fat than other heart-healthy diets. But the fats are mainly from nuts, unsaturated oils (such as fish oils and olive oil), and certain nut or seed oils (such as canola, soybean, or flaxseed oil). These fats may help protect the heart and blood vessels. How can you get started on the Mediterranean diet? Here are some things you can do to switch to a more Mediterranean way of eating. What to eat  · Eat a variety of fruits and vegetables each day, such as grapes, blueberries, tomatoes, broccoli, peppers, figs, olives, spinach, eggplant, beans, lentils, and chickpeas. · Eat a variety of whole-grain foods each day, such as oats, brown rice, and whole wheat bread, pasta, and couscous. · Eat fish at least 2 times a week. Try tuna, salmon, mackerel, lake trout, herring, or sardines. · Eat moderate amounts of low-fat dairy products, such as milk, cheese, or yogurt. · Eat moderate amounts of poultry and eggs. · Choose healthy (unsaturated) fats, such as nuts, olive oil, and certain nut or seed oils like canola, soybean, and flaxseed. · Limit unhealthy (saturated) fats, such as butter, palm oil, and coconut oil. And limit fats found in animal products, such as meat and dairy products made with whole milk. Try to eat red meat only a few times a month in very small amounts. · Limit sweets and desserts to only a few times a week. This includes sugar-sweetened drinks like soda. The Mediterranean diet may also include red wine with your meal--1 glass each day for women and up to 2 glasses a day for men. Tips for eating at home  · Use herbs, spices, garlic, lemon zest, and citrus juice instead of salt to add flavor to foods. · Add avocado slices to your sandwich instead of posada. · Have fish for lunch or dinner instead of red meat. Brush the fish with olive oil, and broil or grill it.   · Sprinkle your salad with seeds or nuts instead of cheese. · Cook with olive or canola oil instead of butter or oils that are high in saturated fat. · Switch from 2% milk or whole milk to 1% or fat-free milk. · Dip raw vegetables in a vinaigrette dressing or hummus instead of dips made from mayonnaise or sour cream.  · Have a piece of fruit for dessert instead of a piece of cake. Try baked apples, or have some dried fruit. Tips for eating out  · Try broiled, grilled, baked, or poached fish instead of having it fried or breaded. · Ask your  to have your meals prepared with olive oil instead of butter. · Order dishes made with marinara sauce or sauces made from olive oil. Avoid sauces made from cream or mayonnaise. · Choose whole-grain breads, whole wheat pasta and pizza crust, brown rice, beans, and lentils. · Cut back on butter or margarine on bread. Instead, you can dip your bread in a small amount of olive oil. · Ask for a side salad or grilled vegetables instead of french fries or chips. Where can you learn more? Go to https://[employer].pePyramid Screening Technology.[employer]. org and sign in to your [employer] account. Enter [phone] in the [facility] box to learn more about \"Learning About the Mediterranean Diet. \"     If you do not have an account, please click on the \"Sign Up Now\" link. Current as of: September 8, 2021               Content Version: 13.1  © 2006-2021 Healthwise, Incorporated. Care instructions adapted under license by [facility] ([facility]). If you have questions about a medical condition or this instruction, always ask your healthcare professional. [address] any warranty or liability for your use of this information.        ma  bp check in 1 month

## 2022-02-15 ENCOUNTER — OFFICE VISIT (OUTPATIENT)
Dept: CARDIOLOGY CLINIC | Age: 67
End: 2022-02-15
Payer: MEDICARE

## 2022-02-15 VITALS
DIASTOLIC BLOOD PRESSURE: 70 MMHG | HEIGHT: 64 IN | HEART RATE: 60 BPM | BODY MASS INDEX: 50.02 KG/M2 | SYSTOLIC BLOOD PRESSURE: 110 MMHG | WEIGHT: 293 LBS

## 2022-02-15 DIAGNOSIS — E66.01 MORBIDLY OBESE (HCC): ICD-10-CM

## 2022-02-15 DIAGNOSIS — E78.5 HYPERLIPIDEMIA, UNSPECIFIED HYPERLIPIDEMIA TYPE: Primary | ICD-10-CM

## 2022-02-15 PROCEDURE — 1123F ACP DISCUSS/DSCN MKR DOCD: CPT | Performed by: INTERNAL MEDICINE

## 2022-02-15 PROCEDURE — G8400 PT W/DXA NO RESULTS DOC: HCPCS | Performed by: INTERNAL MEDICINE

## 2022-02-15 PROCEDURE — G8427 DOCREV CUR MEDS BY ELIG CLIN: HCPCS | Performed by: INTERNAL MEDICINE

## 2022-02-15 PROCEDURE — 1090F PRES/ABSN URINE INCON ASSESS: CPT | Performed by: INTERNAL MEDICINE

## 2022-02-15 PROCEDURE — 99214 OFFICE O/P EST MOD 30 MIN: CPT | Performed by: INTERNAL MEDICINE

## 2022-02-15 PROCEDURE — 1036F TOBACCO NON-USER: CPT | Performed by: INTERNAL MEDICINE

## 2022-02-15 PROCEDURE — 4040F PNEUMOC VAC/ADMIN/RCVD: CPT | Performed by: INTERNAL MEDICINE

## 2022-02-15 PROCEDURE — 3017F COLORECTAL CA SCREEN DOC REV: CPT | Performed by: INTERNAL MEDICINE

## 2022-02-15 PROCEDURE — G8417 CALC BMI ABV UP PARAM F/U: HCPCS | Performed by: INTERNAL MEDICINE

## 2022-02-15 PROCEDURE — G8484 FLU IMMUNIZE NO ADMIN: HCPCS | Performed by: INTERNAL MEDICINE

## 2022-02-15 NOTE — PROGRESS NOTES
[address]   Dr Winnie Sheikh. Alissa Workman MD, [facility]    Outpatient Follow Up Note    2/15/2022,11:32 AM  Subjective:   CHIEF COMPLAINT / HPI:  Follow Up secondary to hypertension. Right bundle branch block. Pattie Le is 78 y.o. female who presents today for a routine follow up. Here today for follow-up. Has not experienced any chest discomfort shortness of breath or dyspnea. Actually has lost about 6 pounds. Recently started on hydrochlorothiazide for blood pressure and it seems to be helping. Last LDL was 84. Examination today shows unremarkable. No edema but she still quite heavy with a BMI of 54.58. Coronavirus vaccine x2 without issues moderna    Coronavirus infection January 2021 but no pneumonia      Obstructive sleep apnea on CPAP           Past Medical History:    Past Medical History:   Diagnosis Date    Allergic rhinitis     Chronic back pain 3/23/2016    Hyperlipidemia     Leukocytosis     Obesity     Spondylosis of lumbar region without myelopathy or radiculopathy 3/23/2016     Past Surgical History  Past Surgical History:   Procedure Laterality Date    ANKLE SURGERY      right-broken bones    BREAST BIOPSY      benign   [facility], 1989    baby head too large, heart rate dropped    OTHER SURGICAL HISTORY  8/1996    sweat glands infected     Social History:       Social History     Tobacco Use   Smoking Status Never Smoker   Smokeless Tobacco Never Used     Current Medications:  Prior to Visit Medications    Medication Sig Taking?  Authorizing Provider   cetirizine (EQ ALLERGY RELIEF, CETIRIZINE,) 10 MG tablet Take 1 tablet by mouth once daily Yes Shaji Mcintosh MD   hydroCHLOROthiazide (HYDRODIURIL) 25 MG tablet Take 1 tablet by mouth daily Yes Shaji Mcintosh MD   acetaminophen (TYLENOL) 500 MG tablet Take 1 tablet by mouth 4 times daily as needed for Pain Yes Shaji Mcintosh MD   Azelastine HCl 137 MCG/SPRAY SOLN 2 SPRAYS BY NASAL ROUTE 2 TIMES DAILY Yes Maggie Mejia MD   atorvastatin (LIPITOR) 10 MG tablet Take 1 tablet by mouth daily Yes Maggie Mejia MD   ipratropium (ATROVENT) 0.03 % nasal spray  Yes Historical Provider, MD     Family History  Family History   Problem Relation Age of Onset    Heart Disease Father     Rheum Arthritis Neg Hx     Osteoarthritis Neg Hx     Asthma Neg Hx     Breast Cancer Neg Hx     Cancer Neg Hx     Diabetes Neg Hx     Heart Failure Neg Hx     High Cholesterol Neg Hx     Hypertension Neg Hx     Migraines Neg Hx     Ovarian Cancer Neg Hx     Rashes/Skin Problems Neg Hx     Seizures Neg Hx     Stroke Neg Hx     Thyroid Disease Neg Hx        Current Medications  Current Outpatient Medications   Medication Sig Dispense Refill    cetirizine (EQ ALLERGY RELIEF, CETIRIZINE,) 10 MG tablet Take 1 tablet by mouth once daily 90 tablet 1    hydroCHLOROthiazide (HYDRODIURIL) 25 MG tablet Take 1 tablet by mouth daily 90 tablet 1    acetaminophen (TYLENOL) 500 MG tablet Take 1 tablet by mouth 4 times daily as needed for Pain 360 tablet 1    Azelastine HCl 137 MCG/SPRAY SOLN 2 SPRAYS BY NASAL ROUTE 2 TIMES DAILY 90 mL 11    atorvastatin (LIPITOR) 10 MG tablet Take 1 tablet by mouth daily 90 tablet 1    ipratropium (ATROVENT) 0.03 % nasal spray        No current facility-administered medications for this visit. REVIEW OF SYSTEMS:    CONSTITUTIONAL: No major weight gain or loss, fatigue, weakness, night sweats or fever. HEENT: No new vision difficulties or ringing in the ears. RESPIRATORY: No new SOB, PND, orthopnea or cough. CARDIOVASCULAR: See HPI  GI: No nausea, vomiting, diarrhea, constipation, abdominal pain or changes in bowel habits. : No urinary frequency, urgency, incontinence hematuria or dysuria. SKIN: No cyanosis or skin lesions. MUSCULOSKELETAL: No new muscle or joint pain. NEUROLOGICAL: No syncope or TIA-like symptoms.   PSYCHIATRIC: No anxiety, pain, insomnia or depression    Objective:   PHYSICAL EXAM:        VITALS:    Wt Readings from Last 3 Encounters:   02/15/22 (!) 318 lb (144.2 kg)   01/21/22 (!) 324 lb (147 kg)   12/16/21 (!) 320 lb (145.2 kg)     BP Readings from Last 3 Encounters:   02/15/22 110/70   01/21/22 (!) 176/80   12/16/21 (!) 156/78     Pulse Readings from Last 3 Encounters:   02/15/22 60   01/21/22 72   12/16/21 71       CONSTITUTIONAL: Cooperative, no apparent distress, and appears well nourished / developed  NEUROLOGIC:  Awake and orientated to person, place and time. PSYCH: Calm affect. SKIN: Warm and dry. HEENT: Sclera non-icteric, normocephalic, neck supple, no elevation of JVP, normal carotid pulses with no bruits and thyroid normal size. LUNGS:  No increased work of breathing and clear to auscultation, no crackles or wheezing  CARDIOVASCULAR:  Regular rate and rhythm with no murmurs, gallops, rubs, or abnormal heart sounds, normal PMI. The apical impulses not displaced  Heart tones are crisp and normal  Cervical veins are not engorged  The carotid upstroke is normal in amplitude and contour without delay or bruit  JVP is not elevated  ABDOMEN:  Normal bowel sounds, non-distended and non-tender to palpation  EXT: No edema, no calf tenderness. Pulses are present bilaterally.     DATA:    Lab Results   Component Value Date    ALT 9 (L) 03/11/2019    AST 12 (L) 03/11/2019    ALKPHOS 92 03/11/2019    BILITOT 0.5 03/11/2019     Lab Results   Component Value Date    CREATININE 0.7 03/11/2019    BUN 9 03/11/2019     03/11/2019    K 4.4 03/11/2019     03/11/2019    CO2 26 03/11/2019     Lab Results   Component Value Date    TSH 1.27 11/19/2020     Lab Results   Component Value Date    WBC 9.6 06/07/2021    HGB 13.1 06/07/2021    HCT 40.6 06/07/2021    MCV 90.0 06/07/2021     06/07/2021     No components found for: CHLPL  Lab Results   Component Value Date    TRIG 98 04/09/2018    TRIG 87 09/10/2013    TRIG 121 08/30/2012     Lab Results   Component Value Date    HDL 67 (H) 06/07/2021    HDL 63 (H) 11/19/2020    HDL 68 (H) 03/11/2019     Lab Results   Component Value Date    LDLCALC 84 06/07/2021    LDLCALC 94 11/19/2020    LDLCALC 70 03/11/2019     Lab Results   Component Value Date    LABVLDL 18 06/07/2021    LABVLDL 14 11/19/2020    LABVLDL 17 03/11/2019     Radiology Review:  Pertinent images / reports were reviewed as a part of this visit and reveals the following:    Echo:    Stress Test / Angiogram:  IMPRESSION: 10/12/18            No significant abnormality       ECG: On 9/24/2021 sinus rhythm at 73/min. Right bundle branch block. Stable pattern. This patient was educated using the patient point room wall mount device. Absence from smokers and smoking and diet and exercising are important. Assessment:   Increasing shortness of breath and dyspnea with activity. At suspect most of this is related to her gain weight and poor conditioning status. I think this was made worse by the pandemic last year. Plan:     Continue current medicines. She needs a fasting lipid and CMP. She is to follow-up with Dr. Dylan Roy I will be glad to see her on a as needed basis in 1 year. Please call if we can assist further [phone]. Angelica Dash MD, [facility]      This note was likely completed using voice recognition technology and may contain unintended errors

## 2022-02-28 RX ORDER — ATORVASTATIN CALCIUM 10 MG/1
TABLET, FILM COATED ORAL
Qty: 90 TABLET | Refills: 3 | Status: SHIPPED | OUTPATIENT
Start: 2022-02-28

## 2022-06-20 ENCOUNTER — TELEPHONE (OUTPATIENT)
Dept: OTHER | Facility: CLINIC | Age: 67
End: 2022-06-20

## 2022-06-20 NOTE — TELEPHONE ENCOUNTER
Margot Alatorre was contacted today as part of [facility] to schedule a mammogram.         I left a message reminding the patient that they have an open order from Yomaira Reyna MD and need to contact me to schedule a mammogram.    Wilder Black [address]

## 2022-07-01 ENCOUNTER — HOSPITAL ENCOUNTER (OUTPATIENT)
Dept: MAMMOGRAPHY | Age: 67
Discharge: HOME | End: 2022-07-01
Payer: MEDICARE

## 2022-07-01 VITALS — HEIGHT: 64 IN | WEIGHT: 293 LBS | BODY MASS INDEX: 50.02 KG/M2

## 2022-07-01 DIAGNOSIS — Z12.31 ENCOUNTER FOR MAMMOGRAM TO ESTABLISH BASELINE MAMMOGRAM: ICD-10-CM

## 2022-07-01 PROCEDURE — 77063 BREAST TOMOSYNTHESIS BI: CPT

## 2022-07-07 DIAGNOSIS — N64.89 BREAST ASYMMETRY IN FEMALE: ICD-10-CM

## 2022-07-07 DIAGNOSIS — M51.16 LUMBAR DISC DISEASE WITH RADICULOPATHY: ICD-10-CM

## 2022-07-07 DIAGNOSIS — R92.8 ABNORMALITY OF LEFT BREAST ON SCREENING MAMMOGRAM: Primary | ICD-10-CM

## 2022-07-07 RX ORDER — METHYLPREDNISOLONE 4 MG/1
TABLET ORAL
Qty: 21 TABLET | Refills: 0 | Status: SHIPPED | OUTPATIENT
Start: 2022-07-07 | End: 2022-07-13

## 2022-07-11 ENCOUNTER — HOSPITAL ENCOUNTER (OUTPATIENT)
Dept: ULTRASOUND IMAGING | Age: 67
Discharge: HOME | End: 2022-07-11
Payer: MEDICARE

## 2022-07-11 ENCOUNTER — HOSPITAL ENCOUNTER (OUTPATIENT)
Dept: MAMMOGRAPHY | Age: 67
Discharge: HOME | End: 2022-07-11
Payer: MEDICARE

## 2022-07-11 DIAGNOSIS — R92.8 ABNORMAL MAMMOGRAM: ICD-10-CM

## 2022-07-11 PROCEDURE — G0279 TOMOSYNTHESIS, MAMMO: HCPCS

## 2022-07-11 PROCEDURE — 76642 ULTRASOUND BREAST LIMITED: CPT

## 2022-07-14 DIAGNOSIS — E78.5 HYPERLIPIDEMIA, UNSPECIFIED HYPERLIPIDEMIA TYPE: ICD-10-CM

## 2022-07-14 DIAGNOSIS — E66.01 MORBIDLY OBESE (HCC): ICD-10-CM

## 2022-07-15 ENCOUNTER — OFFICE VISIT (OUTPATIENT)
Dept: PRIMARY CARE CLINIC | Age: 67
End: 2022-07-15
Payer: MEDICARE

## 2022-07-15 VITALS
BODY MASS INDEX: 50.02 KG/M2 | TEMPERATURE: 97.2 F | SYSTOLIC BLOOD PRESSURE: 117 MMHG | WEIGHT: 293 LBS | HEART RATE: 67 BPM | DIASTOLIC BLOOD PRESSURE: 80 MMHG | HEIGHT: 64 IN | OXYGEN SATURATION: 97 %

## 2022-07-15 DIAGNOSIS — Z00.00 INITIAL MEDICARE ANNUAL WELLNESS VISIT: Primary | ICD-10-CM

## 2022-07-15 DIAGNOSIS — Z71.89 ACP (ADVANCE CARE PLANNING): ICD-10-CM

## 2022-07-15 LAB
A/G RATIO: 1.4 (ref 1.1–2.2)
ALBUMIN SERPL-MCNC: 4.1 G/DL (ref 3.4–5)
ALP BLD-CCNC: 108 U/L (ref 40–129)
ALT SERPL-CCNC: 13 U/L (ref 10–40)
ANION GAP SERPL CALCULATED.3IONS-SCNC: 13 MMOL/L (ref 3–16)
AST SERPL-CCNC: 10 U/L (ref 15–37)
BILIRUB SERPL-MCNC: 0.4 MG/DL (ref 0–1)
BUN BLDV-MCNC: 19 MG/DL (ref 7–20)
CALCIUM SERPL-MCNC: 9.8 MG/DL (ref 8.3–10.6)
CHLORIDE BLD-SCNC: 99 MMOL/L (ref 99–110)
CHOLESTEROL, TOTAL: 190 MG/DL (ref 0–199)
CO2: 27 MMOL/L (ref 21–32)
CREAT SERPL-MCNC: 0.8 MG/DL (ref 0.6–1.2)
GFR AFRICAN AMERICAN: >60
GFR NON-AFRICAN AMERICAN: >60
GLUCOSE BLD-MCNC: 86 MG/DL (ref 70–99)
HDLC SERPL-MCNC: 77 MG/DL (ref 40–60)
LDL CHOLESTEROL CALCULATED: 94 MG/DL
POTASSIUM SERPL-SCNC: 3.9 MMOL/L (ref 3.5–5.1)
SODIUM BLD-SCNC: 139 MMOL/L (ref 136–145)
TOTAL PROTEIN: 7 G/DL (ref 6.4–8.2)
TRIGL SERPL-MCNC: 97 MG/DL (ref 0–150)
VLDLC SERPL CALC-MCNC: 19 MG/DL

## 2022-07-15 PROCEDURE — G0438 PPPS, INITIAL VISIT: HCPCS | Performed by: FAMILY MEDICINE

## 2022-07-15 PROCEDURE — 3017F COLORECTAL CA SCREEN DOC REV: CPT | Performed by: FAMILY MEDICINE

## 2022-07-15 PROCEDURE — 1123F ACP DISCUSS/DSCN MKR DOCD: CPT | Performed by: FAMILY MEDICINE

## 2022-07-15 RX ORDER — IBUPROFEN 600 MG/1
TABLET ORAL
COMMUNITY
Start: 2022-06-15

## 2022-07-15 SDOH — ECONOMIC STABILITY: FOOD INSECURITY: WITHIN THE PAST 12 MONTHS, THE FOOD YOU BOUGHT JUST DIDN'T LAST AND YOU DIDN'T HAVE MONEY TO GET MORE.: NEVER TRUE

## 2022-07-15 SDOH — ECONOMIC STABILITY: FOOD INSECURITY: WITHIN THE PAST 12 MONTHS, YOU WORRIED THAT YOUR FOOD WOULD RUN OUT BEFORE YOU GOT MONEY TO BUY MORE.: NEVER TRUE

## 2022-07-15 ASSESSMENT — PATIENT HEALTH QUESTIONNAIRE - PHQ9
SUM OF ALL RESPONSES TO PHQ QUESTIONS 1-9: 0
SUM OF ALL RESPONSES TO PHQ9 QUESTIONS 1 & 2: 0
SUM OF ALL RESPONSES TO PHQ QUESTIONS 1-9: 0
2. FEELING DOWN, DEPRESSED OR HOPELESS: 0
1. LITTLE INTEREST OR PLEASURE IN DOING THINGS: 0

## 2022-07-15 ASSESSMENT — LIFESTYLE VARIABLES: HOW OFTEN DO YOU HAVE A DRINK CONTAINING ALCOHOL: NEVER

## 2022-07-15 ASSESSMENT — SOCIAL DETERMINANTS OF HEALTH (SDOH): HOW HARD IS IT FOR YOU TO PAY FOR THE VERY BASICS LIKE FOOD, HOUSING, MEDICAL CARE, AND HEATING?: NOT VERY HARD

## 2022-07-15 NOTE — PATIENT INSTRUCTIONS
Advance Directives: Care Instructions  Overview  An advance directive is a legal way to state your wishes at the end of your life. It tells your family and your doctor what to do if you can't say what youwant. There are two main types of advance directives. You can change them any timeyour wishes change. Living will. This form tells your family and your doctor your wishes about life support and other treatment. The form is also called a declaration. Medical power of . This form lets you name a person to make treatment decisions for you when you can't speak for yourself. This person is called a health care agent (health care proxy, health care surrogate). The form is also called a durable power of  for health care. If you do not have an advance directive, decisions about your medical care maybe made by a family member, or by a doctor or a  who doesn't know you. It may help to think of an advance directive as a gift to the people who carefor you. If you have one, they won't have to make tough decisions by themselves. Follow-up care is a key part of your treatment and safety. Be sure to make and go to all appointments, and call your doctor if you are having problems. It's also a good idea to know your test results and keep alist of the medicines you take. What should you include in an advance directive? Many states have a unique advance directive form. (It may ask you to address specific issues.) Or you might use a universal form that's approved by manystates. If your form doesn't tell you what to address, it may be hard to know what to include in your advance directive. Use the questions below to help you getstarted. Who do you want to make decisions about your medical care if you are not able to? What life-support measures do you want if you have a serious illness that gets worse over time or can't be cured? What are you most afraid of that might happen?  (Maybe you're afraid of having pain, losing your independence, or being kept alive by machines.)  Where would you prefer to die? (Your home? A hospital? A nursing home?)  Do you want to donate your organs when you die? Do you want certain Confucianism practices performed before you die? When should you call for help? Be sure to contact your doctor if you have any questions. Where can you learn more? Go to https://[email or web address] org and sign in to your [employer] account. Enter R264 in the [employer] box to learn more about \"Advance Directives: Care Instructions. \"     If you do not have an account, please click on the \"Sign Up Now\" link. Current as of: October 18, 2021               Content Version: 13.3  © 2006-2022 Healthwise, [employer]. Care instructions adapted under license by [facility] ([facility]). If you have questions about a medical condition or this instruction, always ask your healthcare professional. [place] [address] any warranty or liability for your use of this information. Learning About Medical Power of   What is a medical power of ? A medical power of , also called a durable power of  for health care, is one type of the legal forms called advance directives. It lets you name the person you want to make treatment decisions for you if you can't speak or decide for yourself. The person you choose is called your health care agent. This person is also called a health care proxy or health care surrogate. A medical power of  may be called something else in your state. How do you choose a health care agent? Choose your health care agent carefully. This person may or may not be a familymember. Talk to the person before you make your final decision. Make sure he or she iscomfortable with this responsibility. It's a good idea to choose someone who:  Is at least 25years old.   Knows you well and understands what makes life meaningful for you.  Understands your Alevism and moral values. Will do what you want, not what he or she wants. Will be able to make difficult choices at a stressful time. Will be able to refuse or stop treatment, if that is what you would want, even if you could die. Will be firm and confident with health professionals if needed. Will ask questions to get needed information. Lives near you or agrees to travel to you if needed. Your family may help you make medical decisions while you can still be part of that process. But it's important to choose one person to be your health careagent in case you aren't able to make decisions for yourself. If you don't fill out the legal form and name a health care agent, thedecisions your family can make may be limited. A health care agent may be called something else in your state. Who will make decisions for you if you don't have a health care agent? If you don't have a health care agent or a living will, you may not get the care you want. Decisions may be made by family members who disagree about your medical care. Or decisions may be made by a medical professional who doesn'tknow you well. In some cases, a  makes the decisions. When you name a health care agent, it is very clear who has the power to [place] decisions for you. How do you name a health care agent? You name your health care agent on a legal form. This form is usually called a medical power of . Ask your hospital, state bar association, or officeon aging where to find these forms. You must sign the form to make it legal. Some states require you to get the form notarized. This means that a person called a  watches you sign the form and then he or she signs the form. Some states also require thattwo or more witnesses sign the form. Be sure to tell your family members and doctors who your health care agent is. Where can you learn more? Go to https://bradley.healthFarmBotpartners. org and sign in to your [facility] account. Enter [id number] in the [facility] box to learn more about \"Learning About [address]. \"     If you do not have an account, please click on the \"Sign Up Now\" link. Current as of: October 18, 2021               Content Version: 13.3  © 2006-2022 [employer]. Care instructions adapted under license by [facility] ([facility]). If you have questions about a medical condition or this instruction, always ask your healthcare professional. [address] any warranty or liability for your use of this information. Learning About Living Roselle Najjar  What is a living will? A living will, also called a declaration, is a legal form. It tells your family and your doctor your wishes when you can't speak for yourself. It's used by the health professionals who will treat you as you near the end of your life or ifyou get seriously hurt or ill. If you put your wishes in writing, your loved ones and others will know what kind of care you want. They won't need to guess. This can ease your mind and behelpful to others. And you can change or cancel your living will at any time. A living will is not the same as an estate or property will. An estate willexplains what you want to happen with your money and property after you die. How do you use it? Keep these facts in mind about how a living will is used. Your living will is used only if you can't speak or make decisions for yourself. Most often, one or more doctors must certify that you can't speak or decide for yourself before your living will takes effect. If you get better and can speak for yourself again, you can accept or refuse any treatment. It doesn't matter what you said in your living will. Some states may limit your right to refuse treatment in certain cases.  For example, you may need to clearly state in your living will that you don't want artificial hydration and nutrition, such as being fed through a tube. Is a living will a legal document? A living will is a legal document. Each state has its own laws about livingwills. And a living will may be called something else in your state. Here are some things to know about living goncalves. You don't need an  to complete a living will. But legal advice can be helpful if your state's laws are unclear. It can also help if your health history is complicated or your family can't agree on what should be in your living will. You can change your living will at any time. Some people find that their wishes about end-of-life care change as their health changes. If you make big changes to your living will, complete a new form. If you move to another state, make sure that your living will is legal in the state where you now live. In most cases, doctors will respect your wishes even if you have a form from a different state. You might use a universal form that has been approved by many states. This kind of form can sometimes be filled out and stored online. Your digital copy will then be available wherever you have a connection to the internet. The doctors and nurses who need to treat you can find it right away. Your state may offer an online registry. This is another place where you can store your living will online. It's a good idea to get your living will notarized. This means using a person called a  to watch two people sign, or witness, your living will. What should you know when you create a living will? Here are some questions to ask yourself as you make your living will. Do you know enough about life support methods that might be used? If not, talk to your doctor so you know what might be done if you can't breathe on your own, your heart stops, or you can't swallow. What things would you still want to be able to do after you receive life-support methods? Would you want to be able to walk? To speak? To eat on your own?  To live without the help of machines? Do you want certain Buddhist practices performed if you become very ill? If you have a choice, where do you want to be cared for? In your home? At a hospital or nursing home? If you have a choice at the end of your life, where would you prefer to die? At home? In a hospital or nursing home? Somewhere else? Would you prefer to be buried or cremated? Do you want your organs to be donated after you die? What should you do with your living will? Make sure that your family members and your health care agent have copies of your living will (also called a declaration). Give your doctor a copy of your living will. Ask to have it kept as part of your medical record. If you have more than one doctor, make sure that each one has a copy. Put a copy of your living will where it can be easily found. For example, some people may put a copy on their refrigerator door. If you are using a digital copy, be sure your doctor, family members, and health care agent know how to find and access it. Where can you learn more? Go to https://ArsanispeYeePay.[employer]uitive [employer]s. org and sign in to your [employer] account. Enter G325 in the [employer] box to learn more about \"Learning About Living [place]. \"     If you do not have an account, please click on the \"Sign Up Now\" link. Current as of: October 18, 2021               Content Version: 13.3  © 3233-9764 Healthwise, [employer]. Care instructions adapted under license by [facility] ([facility]). If you have questions about a medical condition or this instruction, always ask your healthcare professional. [place] [address] any warranty or liability for your use of this information. Personalized Preventive Plan for Mihir Krishnamurthy - 7/15/2022  Medicare offers a range of preventive health benefits.  Some of the tests and screenings are paid in full while other may be subject to a deductible, co-insurance, and/or copay.    Some of these benefits include a comprehensive review of your medical history including lifestyle, illnesses that may run in your family, and various assessments and screenings as appropriate. After reviewing your medical record and screening and assessments performed today your provider may have ordered immunizations, labs, imaging, and/or referrals for you. A list of these orders (if applicable) as well as your Preventive Care list are included within your After Visit Summary for your review. Other Preventive Recommendations:    A preventive eye exam performed by an eye specialist is recommended every 1-2 years to screen for glaucoma; cataracts, macular degeneration, and other eye disorders. A preventive dental visit is recommended every 6 months. Try to get at least 150 minutes of exercise per week or 10,000 steps per day on a pedometer . Order or download the FREE \"Exercise & Physical Activity: Your Everyday Guide\" from [employer]. Call 3-221.976.6356 or search The [employer] online. You need 6136-3075 mg of calcium and 4256-7079 IU of vitamin D per day. It is possible to meet your calcium requirement with diet alone, but a vitamin D supplement is usually necessary to meet this goal.  When exposed to the sun, use a sunscreen that protects against both UVA and UVB radiation with an SPF of 30 or greater. Reapply every 2 to 3 hours or after sweating, drying off with a towel, or swimming. Always wear a seat belt when traveling in a car. Always wear a helmet when riding a bicycle or motorcycle.

## 2022-07-15 NOTE — PROGRESS NOTES
Medicare Annual Wellness Visit    Lily Nolasco is here for Medicare AWV    Assessment & Plan    Recommendations for Preventive Services Due: see orders and patient instructions/AVS.  Recommended screening schedule for the next 5-10 years is provided to the patient in written form: see Patient Instructions/AVS.     No follow-ups on file. Subjective       Patient's complete Health Risk Assessment and screening values have been reviewed and are found in Flowsheets. The following problems were reviewed today and where indicated follow up appointments were made and/or referrals ordered.     Positive Risk Factor Screenings with Interventions:             General Health and ACP:  General  In general, how would you say your health is?: Fair  In the past 7 days, have you experienced any of the following: New or Increased Pain, New or Increased Fatigue, Loneliness, Social Isolation, Stress or Anger?: (!) Yes  Select all that apply: (!) New or Increased Pain, Stress, Anger  Do you get the social and emotional support that you need?: Yes  Do you have a Living Will?: (!) No    Advance Directives       Power of  Living Will ACP-Advance Directive ACP-Power of     Not on File Not on File Not on File Not on File        General Health Risk Interventions:  Pain issues: patient advised to follow-up in this office for further evaluation and treatment within 3 month(s)    Health Habits/Nutrition:  Physical Activity: Inactive    Days of Exercise per Week: 0 days    Minutes of Exercise per Session: 0 min     Have you lost any weight without trying in the past 3 months?: No  Body mass index: (!) 56.98  Have you seen the dentist within the past year?: Yes  Health Habits/Nutrition Interventions:  Inadequate physical activity:  patient agrees to exercise for at least 150 minutes/week      ADLs:  In the past 7 days, did you need help from others to perform any of the following everyday activities: Eating, dressing, grooming, bathing, toileting, or walking/balance?: (!) Yes  Select all that apply: (!) Dressing, Grooming, Walking/Balance  In the past 7 days, did you need help from others to take care of any of the following: Laundry, housekeeping, banking/finances, shopping, telephone use, food preparation, transportation, or taking medications?: (!) Yes  Select all that apply: (!) Food Preparation, Transportation  ADL Interventions:  Patient declines any further evaluation/treatment for this issue  She has chronic low back pain radiculopathy  Temporary prevents her from doing activities   helps out with this  [address] (ACP) Physician/NP/PA (Provider) Conversation      Date of ACP Conversation: 7/15/2022    Conversation Conducted with:   Patient with Decision Making Capacity    Health Care Decision Maker:    Current Designated Health Care Decision Maker:    Primary Decision Maker: Marline Fothergill - Spouse - [phone]    Note: Assess and validate information in current ACP documents, as indicated. If no Authorized Decision Maker has previously been identified, then patient chooses [place]:  \"Who would you like to name as your primary health care decision-maker? \"             Name: Matty Smith        Relationship:   self         Phone number: [phone]    \"Can this person be reached easily? \" Yes  \"Who would you like to name as your back-up decision maker? \"   Name:   Fabio Ramirez        Relationship:            Phone number: [phone]  \"Can this person be reached easily? \" Yes    Note: If the relationship of these Decision-Makers to the patient does NOT follow your state's Next of Kin hierarchy, recommend that patient complete ACP document that meets state-specific requirements to allow them to act on the patient's behalf when appropriate. Care Preferences:    Hospitalization:   \"If your health worsens and it becomes clear that your chance of recovery is unlikely, what would your preference be regarding hospitalization? \"  If the patient would want hospitalization, answer \"yes\". If the patient would prefer comfort-focused treatment without hospitalization, answer \"no\". YES/NO/WILD CARDS: yes      Ventilation: \"If you were in your present state of health and suddenly became very ill and were unable to breathe on your own, what would your preference be about the use of a ventilator (breathing machine) if it was available to you? \"    If patient would desire the use of a ventilator (breathing machine), answer \"yes\", if not answer \"no\":yes    \"If your health worsens and it becomes clear that your chance of recovery is unlikely, what would your preference be about the use of a ventilator (breathing machine) if it was available to you? \"   yes    Resuscitation:  \"CPR works best to restart the heart when there is a sudden event, like a heart attack, in someone who is otherwise healthy. Unfortunately, CPR does not typically restart the heart for people who have serious health conditions or who are very sick. \"    \"In the event your heart stopped as a result of an underlying serious health condition, would you want attempts to be made to restart your heart (answer \"yes\" for attempt to resuscitate) or would you prefer a natural death (answer \"no\" for do not attempt to resuscitate)? \"   yes     NOTE: If the patient has a valid advance directive AND provides care preference(s) that are inconsistent with that prior directive, advise the patient to consider either: creating a new advance directive that complies with state-specific requirements; or, if that is not possible, orally revoking that prior directive in accordance with state-specific requirements, which must be documented in the EHR.     Conversation Outcomes / Follow-Up Plan:   Completed new Advance Directive      Length of Voluntary ACP Conversation in minutes:  <16 minutes (Non-Billable)    Dia Dinh MD                    Objective   Vitals:    07/15/22 1411   BP: 117/80   Pulse: 67   Temp: 97.2 °F (36.2 °C)   TempSrc: Temporal   SpO2: 97%   Weight: (!) 332 lb (150.6 kg)   Height: 5' 4\" (1.626 m)      Body mass index is 56.99 kg/m². No Known Allergies  Prior to Visit Medications    Medication Sig Taking?  Authorizing Provider   ibuprofen (ADVIL;MOTRIN) 600 MG tablet TAKE 1 TABLET BY MOUTH EVERY 8 HOURS AS NEEDED FOR PAIN Yes Historical Provider, MD   atorvastatin (LIPITOR) 10 MG tablet Take 1 tablet by mouth once daily Yes Karly Cevallos MD   cetirizine (EQ ALLERGY RELIEF, CETIRIZINE,) 10 MG tablet Take 1 tablet by mouth once daily Yes Karly Cevallos MD   hydroCHLOROthiazide (HYDRODIURIL) 25 MG tablet Take 1 tablet by mouth daily Yes Karly Cevallos MD   acetaminophen (TYLENOL) 500 MG tablet Take 1 tablet by mouth 4 times daily as needed for Pain Yes Karly Cevallos MD   Azelastine HCl 137 MCG/SPRAY SOLN 2 SPRAYS BY NASAL ROUTE 2 TIMES DAILY Yes Karly Cevallos MD   ipratropium (ATROVENT) 0.03 % nasal spray  Yes Historical Provider, MD [place] (Including outside providers/suppliers regularly involved in providing care):   Patient Care Team:  Karly Cevallos MD as PCP - General (Family Medicine)  Karly Cevallos MD as PCP - [facility] Empaneled Provider     Reviewed and updated this visit:  Tobacco  Allergies  Meds  Problems  Med Hx  Surg Hx  Soc Hx  Fam Hx

## 2022-08-03 DIAGNOSIS — J30.2 CHRONIC SEASONAL ALLERGIC RHINITIS: ICD-10-CM

## 2022-08-04 RX ORDER — CETIRIZINE HYDROCHLORIDE 10 MG/1
TABLET ORAL
Qty: 90 TABLET | Refills: 5 | Status: SHIPPED | OUTPATIENT
Start: 2022-08-04

## 2022-08-29 ENCOUNTER — TELEPHONE (OUTPATIENT)
Dept: PRIMARY CARE CLINIC | Age: 67
End: 2022-08-29

## 2022-08-29 DIAGNOSIS — J30.2 SEASONAL ALLERGIC RHINITIS, UNSPECIFIED TRIGGER: ICD-10-CM

## 2022-08-29 NOTE — TELEPHONE ENCOUNTER
Pt called in asking for a refill on   Azelastine HCl 137 MCG/SPRAY [facility] pharmacy   Fax# [phone]      Please advise

## 2022-08-30 DIAGNOSIS — J30.2 SEASONAL ALLERGIC RHINITIS, UNSPECIFIED TRIGGER: ICD-10-CM

## 2022-08-30 RX ORDER — AZELASTINE HYDROCHLORIDE 137 UG/1
SPRAY, METERED NASAL
Qty: 90 ML | Refills: 11 | Status: CANCELLED | OUTPATIENT
Start: 2022-08-30

## 2022-08-30 RX ORDER — AZELASTINE HYDROCHLORIDE 137 UG/1
SPRAY, METERED NASAL
Qty: 90 ML | Refills: 11 | Status: SHIPPED | OUTPATIENT
Start: 2022-08-30

## 2022-08-30 NOTE — TELEPHONE ENCOUNTER
Medication:   Requested Prescriptions     Pending Prescriptions Disp Refills    Azelastine HCl 137 MCG/SPRAY SOLN 90 mL 11     Si SPRAYS BY NASAL ROUTE 2 TIMES DAILY        Last Filled:      Patient Phone Number: [phone] (home) [phone] (work)    Last appt: 7/15/2022   Next appt: 2023    Last OARRS: No flowsheet data found.

## 2022-09-01 DIAGNOSIS — J30.2 SEASONAL ALLERGIC RHINITIS, UNSPECIFIED TRIGGER: ICD-10-CM

## 2022-09-01 RX ORDER — AZELASTINE HYDROCHLORIDE 137 UG/1
SPRAY, METERED NASAL
Qty: 90 ML | Refills: 11 | OUTPATIENT
Start: 2022-09-01

## 2022-09-01 NOTE — TELEPHONE ENCOUNTER
Medication:   Requested Prescriptions     Pending Prescriptions Disp Refills    Azelastine HCl 137 MCG/SPRAY SOLN 90 mL 11     Si SPRAYS BY NASAL ROUTE 2 TIMES DAILY        Last Filled:      Patient Phone Number: [phone] (home) [phone] (work)    Last appt: 7/15/2022   Next appt: 2023    Last OARRS: No flowsheet data found.

## 2022-09-13 ENCOUNTER — TELEPHONE (OUTPATIENT)
Dept: PRIMARY CARE CLINIC | Age: 67
End: 2022-09-13

## 2022-09-13 NOTE — TELEPHONE ENCOUNTER
Pt called in because she had Aquatic therapy yesterday and now the inside of her hand are peeling bad. Was told to call her pcp to see what he wanted to do.       Please advise

## 2022-09-14 ENCOUNTER — OFFICE VISIT (OUTPATIENT)
Dept: PRIMARY CARE CLINIC | Age: 67
End: 2022-09-14
Payer: MEDICARE

## 2022-09-14 VITALS
RESPIRATION RATE: 18 BRPM | WEIGHT: 293 LBS | BODY MASS INDEX: 57.85 KG/M2 | HEART RATE: 84 BPM | OXYGEN SATURATION: 97 % | SYSTOLIC BLOOD PRESSURE: 133 MMHG | TEMPERATURE: 97.2 F | DIASTOLIC BLOOD PRESSURE: 71 MMHG

## 2022-09-14 DIAGNOSIS — L23.5 ALLERGIC DERMATITIS DUE TO OTHER CHEMICAL PRODUCT: Primary | ICD-10-CM

## 2022-09-14 PROCEDURE — 1036F TOBACCO NON-USER: CPT | Performed by: FAMILY MEDICINE

## 2022-09-14 PROCEDURE — 1123F ACP DISCUSS/DSCN MKR DOCD: CPT | Performed by: FAMILY MEDICINE

## 2022-09-14 PROCEDURE — G8400 PT W/DXA NO RESULTS DOC: HCPCS | Performed by: FAMILY MEDICINE

## 2022-09-14 PROCEDURE — 3017F COLORECTAL CA SCREEN DOC REV: CPT | Performed by: FAMILY MEDICINE

## 2022-09-14 PROCEDURE — 99213 OFFICE O/P EST LOW 20 MIN: CPT | Performed by: FAMILY MEDICINE

## 2022-09-14 PROCEDURE — 1090F PRES/ABSN URINE INCON ASSESS: CPT | Performed by: FAMILY MEDICINE

## 2022-09-14 PROCEDURE — G8427 DOCREV CUR MEDS BY ELIG CLIN: HCPCS | Performed by: FAMILY MEDICINE

## 2022-09-14 PROCEDURE — G8417 CALC BMI ABV UP PARAM F/U: HCPCS | Performed by: FAMILY MEDICINE

## 2022-09-14 RX ORDER — TRIAMCINOLONE ACETONIDE 1 MG/G
CREAM TOPICAL
Qty: 45 G | Refills: 1 | Status: SHIPPED | OUTPATIENT
Start: 2022-09-14

## 2022-09-14 NOTE — PROGRESS NOTES
Skin rash    HPI  75 y.o. female known sciatica  Left side has been doing water aquatics  For a month  She has noticed intermittent hand rash palms  2 weeks. Much worse a week ago immediately after getting out of water. Used otc hydrocortisone without relief. Physical Exam  Skin:     Comments: Peeling scaly rash palms both hands       Assessment & Plan    Jim Duke was seen today for other. 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

## 2022-11-16 ENCOUNTER — TELEPHONE (OUTPATIENT)
Dept: PRIMARY CARE CLINIC | Age: 67
End: 2022-11-16

## 2022-11-16 DIAGNOSIS — J30.2 SEASONAL ALLERGIC RHINITIS, UNSPECIFIED TRIGGER: ICD-10-CM

## 2022-11-16 NOTE — TELEPHONE ENCOUNTER
Patient needs Azelastine nasal spray switched to [facility] in [place] instead of [facility]. Refills appear to be available.

## 2022-11-17 RX ORDER — AZELASTINE HYDROCHLORIDE 137 UG/1
SPRAY, METERED NASAL
Qty: 90 ML | Refills: 11 | Status: SHIPPED | OUTPATIENT
Start: 2022-11-17

## 2022-11-17 NOTE — TELEPHONE ENCOUNTER
Medication:   Requested Prescriptions     Pending Prescriptions Disp Refills    Azelastine HCl 137 MCG/SPRAY SOLN 90 mL 11     Si SPRAYS BY NASAL ROUTE 2 TIMES DAILY        Last Filled:      Patient Phone Number: [phone] (home) [phone] (work)    Last appt: 2022   Next appt: 2022    Last OARRS: No flowsheet data found.

## 2022-12-14 ENCOUNTER — OFFICE VISIT (OUTPATIENT)
Dept: PRIMARY CARE CLINIC | Age: 67
End: 2022-12-14
Payer: MEDICARE

## 2022-12-14 VITALS
SYSTOLIC BLOOD PRESSURE: 139 MMHG | HEIGHT: 64 IN | HEART RATE: 78 BPM | OXYGEN SATURATION: 97 % | WEIGHT: 293 LBS | DIASTOLIC BLOOD PRESSURE: 84 MMHG | TEMPERATURE: 97.5 F | BODY MASS INDEX: 50.02 KG/M2

## 2022-12-14 DIAGNOSIS — I10 PRIMARY HYPERTENSION: Primary | ICD-10-CM

## 2022-12-14 DIAGNOSIS — E78.2 MIXED HYPERLIPIDEMIA: ICD-10-CM

## 2022-12-14 DIAGNOSIS — Z12.11 SCREEN FOR COLON CANCER: ICD-10-CM

## 2022-12-14 DIAGNOSIS — Z23 NEEDS FLU SHOT: ICD-10-CM

## 2022-12-14 DIAGNOSIS — M48.061 SPINAL STENOSIS OF LUMBAR REGION WITH RADICULOPATHY: ICD-10-CM

## 2022-12-14 DIAGNOSIS — M54.16 SPINAL STENOSIS OF LUMBAR REGION WITH RADICULOPATHY: ICD-10-CM

## 2022-12-14 DIAGNOSIS — J30.89 NON-SEASONAL ALLERGIC RHINITIS, UNSPECIFIED TRIGGER: ICD-10-CM

## 2022-12-14 PROCEDURE — 1090F PRES/ABSN URINE INCON ASSESS: CPT | Performed by: FAMILY MEDICINE

## 2022-12-14 PROCEDURE — 3074F SYST BP LT 130 MM HG: CPT | Performed by: FAMILY MEDICINE

## 2022-12-14 PROCEDURE — 3078F DIAST BP <80 MM HG: CPT | Performed by: FAMILY MEDICINE

## 2022-12-14 PROCEDURE — 1123F ACP DISCUSS/DSCN MKR DOCD: CPT | Performed by: FAMILY MEDICINE

## 2022-12-14 PROCEDURE — G8427 DOCREV CUR MEDS BY ELIG CLIN: HCPCS | Performed by: FAMILY MEDICINE

## 2022-12-14 PROCEDURE — G8417 CALC BMI ABV UP PARAM F/U: HCPCS | Performed by: FAMILY MEDICINE

## 2022-12-14 PROCEDURE — G8484 FLU IMMUNIZE NO ADMIN: HCPCS | Performed by: FAMILY MEDICINE

## 2022-12-14 PROCEDURE — G8400 PT W/DXA NO RESULTS DOC: HCPCS | Performed by: FAMILY MEDICINE

## 2022-12-14 PROCEDURE — 1036F TOBACCO NON-USER: CPT | Performed by: FAMILY MEDICINE

## 2022-12-14 PROCEDURE — 90694 VACC AIIV4 NO PRSRV 0.5ML IM: CPT | Performed by: FAMILY MEDICINE

## 2022-12-14 PROCEDURE — 3017F COLORECTAL CA SCREEN DOC REV: CPT | Performed by: FAMILY MEDICINE

## 2022-12-14 PROCEDURE — 99214 OFFICE O/P EST MOD 30 MIN: CPT | Performed by: FAMILY MEDICINE

## 2022-12-14 PROCEDURE — G0008 ADMIN INFLUENZA VIRUS VAC: HCPCS | Performed by: FAMILY MEDICINE

## 2022-12-14 RX ORDER — ATORVASTATIN CALCIUM 10 MG/1
TABLET, FILM COATED ORAL
Qty: 90 TABLET | Refills: 3 | Status: SHIPPED | OUTPATIENT
Start: 2022-12-14

## 2022-12-14 RX ORDER — HYDROCHLOROTHIAZIDE 25 MG/1
25 TABLET ORAL DAILY
Qty: 90 TABLET | Refills: 1 | Status: SHIPPED | OUTPATIENT
Start: 2022-12-14

## 2022-12-14 ASSESSMENT — PATIENT HEALTH QUESTIONNAIRE - PHQ9
2. FEELING DOWN, DEPRESSED OR HOPELESS: 0
SUM OF ALL RESPONSES TO PHQ9 QUESTIONS 1 & 2: 0
SUM OF ALL RESPONSES TO PHQ QUESTIONS 1-9: 0
1. LITTLE INTEREST OR PLEASURE IN DOING THINGS: 0
SUM OF ALL RESPONSES TO PHQ QUESTIONS 1-9: 0

## 2022-12-14 NOTE — PROGRESS NOTES
Ashley Almaguer (:  1955) is a 75 y.o. female,Established patient, here for evaluation of the following chief complaint(s):  Rocael Mensah was seen today for 3 month follow-up. Diagnoses and all orders for this visit:    Primary hypertension  Bp at goal < 140/90  Ck labs renal and fasting lipids  Low salt diet  Cont statin and diuretic  -     atorvastatin (LIPITOR) 10 MG tablet; Take 1 tablet by mouth once daily  -     hydroCHLOROthiazide (HYDRODIURIL) 25 MG tablet; Take 1 tablet by mouth daily  -     Basic Metabolic Panel; Future    Mixed hyperlipidemia  No recent lipids  Fasting lipids  -     atorvastatin (LIPITOR) 10 MG tablet; Take 1 tablet by mouth once daily    Spinal stenosis of lumbar region with radiculopathy  Less back pain, no bladder or bowel issues  Her pain is down to a 1 level after PT  Non-seasonal allergic rhinitis, unspecified trigger  No sneezing  No headaches  Improved with  ipratropium nasal spray  Needs flu shot  -     Influenza, FLUAD, (age 72 y+), IM, Preservative Free, 0.5 mL    Screen for colon cancer  -     Amb External Referral To Gastroenterology      No follow-ups on file. Subjective   SUBJECTIVE/OBJECTIVE:  HPI  75 y.o. female PMH HTN HLD SPINAL STENOSIS  RHINITIS    Hypertension  No chest pain, headache, sob, leg edema, stroke, kidney disease     Hyperlipidemia  No muscle aches or muscle weakness or cramps since last visit. Spinal stenosis/low back pain / lumbar area  Has completed PT  Pain down to level 1    Allergic rhinitis  Less sneezing congestion  Less nasal congestion  No wheezing      Review of Systems  see above notes       Objective   Physical Exam  HENT:      Nose: Nose normal. No congestion or rhinorrhea. Eyes:      Extraocular Movements: Extraocular movements intact. Pupils: Pupils are equal, round, and reactive to light. Cardiovascular:      Rate and Rhythm: Normal rate and regular rhythm.       Pulses: Normal

## 2022-12-15 DIAGNOSIS — I10 PRIMARY HYPERTENSION: ICD-10-CM

## 2022-12-16 RX ORDER — HYDROCHLOROTHIAZIDE 25 MG/1
TABLET ORAL
Qty: 90 TABLET | Refills: 0 | OUTPATIENT
Start: 2022-12-16

## 2022-12-19 ENCOUNTER — OFFICE VISIT (OUTPATIENT)
Dept: PULMONOLOGY | Age: 67
End: 2022-12-19
Payer: MEDICARE

## 2022-12-19 VITALS
WEIGHT: 293 LBS | BODY MASS INDEX: 50.02 KG/M2 | DIASTOLIC BLOOD PRESSURE: 72 MMHG | TEMPERATURE: 72.1 F | OXYGEN SATURATION: 96 % | SYSTOLIC BLOOD PRESSURE: 110 MMHG | HEIGHT: 64 IN | RESPIRATION RATE: 16 BRPM | HEART RATE: 82 BPM

## 2022-12-19 DIAGNOSIS — E66.01 CLASS 3 SEVERE OBESITY WITHOUT SERIOUS COMORBIDITY WITH BODY MASS INDEX (BMI) OF 50.0 TO 59.9 IN ADULT, UNSPECIFIED OBESITY TYPE (HCC): ICD-10-CM

## 2022-12-19 DIAGNOSIS — G47.33 OBSTRUCTIVE SLEEP APNEA: Primary | ICD-10-CM

## 2022-12-19 DIAGNOSIS — G47.10 HYPERSOMNOLENCE: ICD-10-CM

## 2022-12-19 PROCEDURE — G8427 DOCREV CUR MEDS BY ELIG CLIN: HCPCS | Performed by: INTERNAL MEDICINE

## 2022-12-19 PROCEDURE — 1036F TOBACCO NON-USER: CPT | Performed by: INTERNAL MEDICINE

## 2022-12-19 PROCEDURE — 1123F ACP DISCUSS/DSCN MKR DOCD: CPT | Performed by: INTERNAL MEDICINE

## 2022-12-19 PROCEDURE — 1090F PRES/ABSN URINE INCON ASSESS: CPT | Performed by: INTERNAL MEDICINE

## 2022-12-19 PROCEDURE — G8400 PT W/DXA NO RESULTS DOC: HCPCS | Performed by: INTERNAL MEDICINE

## 2022-12-19 PROCEDURE — 99213 OFFICE O/P EST LOW 20 MIN: CPT | Performed by: INTERNAL MEDICINE

## 2022-12-19 PROCEDURE — 3017F COLORECTAL CA SCREEN DOC REV: CPT | Performed by: INTERNAL MEDICINE

## 2022-12-19 PROCEDURE — G8484 FLU IMMUNIZE NO ADMIN: HCPCS | Performed by: INTERNAL MEDICINE

## 2022-12-19 PROCEDURE — G8417 CALC BMI ABV UP PARAM F/U: HCPCS | Performed by: INTERNAL MEDICINE

## 2022-12-19 ASSESSMENT — ENCOUNTER SYMPTOMS
RESPIRATORY NEGATIVE: 1
GASTROINTESTINAL NEGATIVE: 1
ALLERGIC/IMMUNOLOGIC NEGATIVE: 1
EYES NEGATIVE: 1

## 2022-12-19 NOTE — PROGRESS NOTES
MA Communication:   The following orders are received by verbal communication from Ino Khanna MD    Orders include:  Orders faxed to [address]       1 yr fu scheduled 12/11/23

## 2022-12-19 NOTE — PATIENT INSTRUCTIONS
Remember to bring a list of pulmonary medications and any CPAP or BiPAP machines to your next appointment with the office. Please keep all of your future appointments scheduled by [address]. Out of respect for other patients and providers, you may be asked to reschedule your appointment if you arrive later than your scheduled appointment time. Appointments cancelled less than 24hrs in advance will be considered a no show. Patients with three missed appointments within 1 year or four missed appointments within 2 years can be dismissed from the practice. Please be aware that our physicians are required to work in the Intensive Care Unit at [facility].  Your appointment may need to be rescheduled if they are designated to work during your appointment time. You may receive a survey regarding the care you received during your visit. Your input is valuable to us. We encourage you to complete and return your survey. We hope you will choose us in the future for your healthcare needs. Pt instructed of all future appointment dates & times, including radiology, labs, procedures & referrals. If procedures were scheduled preparation instructions provided. Instructions on future appointments with [facility] were given.

## 2022-12-19 NOTE — LETTER
[facility], Sleep & [address] [facility],[address] [address]  Phone: [phone]  Fax: [phone]    Chase Guerrero MD        December 19, 2022     Patient: Onesimo Card   YOB: 1955   Date of Visit: 12/19/2022 12/19/22        Onesimo Card      I have seen this patient in the office today and wanted to communicate my findings and recommendations. There are no Patient Instructions on file for this visit.                  Thank you for allowing me to assist in the care of the MD Chase Sunshine MD

## 2022-12-19 NOTE — PROGRESS NOTES
Onesimo Card (:  1955) is a 75 y.o. female,Established patient, here for evaluation of the following chief complaint(s):  Sleep Apnea         ASSESSMENT/PLAN:  1. Obstructive sleep apnea  2. Hypersomnolence  3. Class 3 severe obesity without serious comorbidity with body mass index (BMI) of 50.0 to 59.9 in adult, unspecified obesity type (HCC)      ISABLEA  Sleep study done 3/31/17 showed ahi of 36  with wt of 286       autopap for her  Set at max of 20 And min of 5  Using nasal pillows  Ramp time is 25 min and start at 4 cwp  epr is at 3 all the time        Last download  Using  days  Using 4.5 hr/night  90% pressure is 10.5  No leak at 13 lpm  No sleep apnea, ahi 0.5     dme is [facility]    Still don't have access to the airview,this is now ongoing for 2 years  Lost connection in      Will need to bring cpap at next visit. Feeling the benefit of cpap use  Will continue    Will need supplies         Hypersomnolence  Doing better      Obesity    Weight loss and exercise are going to be important     Weight last year was 302 and then at 298 and them at 314 to 320 to 339    Back pain issues may need to be addressed however care with narcotics and not using cpap on night unable to get to the bed. Vaccinations    covid vaccination +booster      RTC in 1 year                No follow-ups on file. I have personally reviewed and summarized the old records       I have reviewed the lab tests, radiology/sleep reports and medications    I have not downloaded and interpreted the cpap/bipap/pap data. I have made adjustments as described      The patient reports benefit from the above therapies, will continue with current therapeutic approach.                      [address] [facility], SLEEP & [address] [facility],4[address] [facility] [address]  [address] [facility] [address]  Dept: [id number] [facility] [address]: [phone] Diagnosis:  [x] ISABELA (ICD-10: G47.33)  o CSA (ICD-10: G47.31)  [] Complex Sleep Apnea (ICD-10: G47.37)  []  (ICD-10: G47.37)  [] Hypoxemia (ICD-10: R09.02)  [] COPD (J44.90)  [] Chronic Respiratory Failure with hypoxemia (J96.11)  [] Chronicr Respiratory Failure with hypercapnia (J96.12)  [] Restrictive Lung Disease (J98.4)      [] New Rx (Device Preference: _________________________)     [] Change Order       [] Replace ___________  [] Clinical assessments and may include IN-Check device, spirometry and ETCO2 PRN    [] Discontinue Order -  [] CPAP    [] BPAP    [] PAP    [] Oxygen   /   AMA?  [] Yes   [] No              Therapy    AHI: ________ ONEIL: ________  LOW SpO2: ________%      DME: [facility]    DEVICE / SETTINGS RAMP / COMFORT INTERFACE   []  CPAP () Pressure    Ramp: _________ min's  [] Ramp to patient preference General PAP Supply Kit  Medicaid does not cover heated tubing  ([employer])  PAP Tubing:  [x] Heated ()- 1/3 mos                         [x] Regular () - 1/3 mos  [x] Disposable Water Chamber () - 1/6 mos  [x] Disposable Filter () - 2/mo  [x] Non-Disposable Filter () - 1/6 mos   []  BiLevel PAP ()           IPAP        []  BiLevel PAP with   ()       Backup Rate ()      EPAP Rate  [] Adjust FLEX to patient comfort       SUPPLEMENTAL OXYGEN  [] OXYGEN:      Liter/min: _________  [] Continuous        [] Nocturnal  [] Bleed into PAP Device      []  [address] Kit    [x] Mask interface () - 1/3 mos  [x] Nasal Cushion () - 2/mo  [x] Nasal Pillows ()  -2/mo  [x] Headgear () - 1/6 mos   []  AutoBiLevel () Pressure  ()      Support           []  ResMed® IVAPS EPAP  [] Overnight Oximetry on Room Air  [] Overnight Oximetry on PAP Therapy  [] Overnight Oximetry on ___ LPM of oxygen  []  Overnight Oximetry on  PAP therapy with _____ lpm of O2    Target Pt Rate  Min PS      Target Length of Need: 13 Months              o The patient requires BiLevel PAP and the following apply: []  The patient requires a Respiratory Assist Device (RAD) and the following apply:   o CPAP was tried and failed to meet therapeutic goals. [] CPAP was tried, but failed to meet therapeutic goals   o The prescribed mask interface has been properly fit, is the most comfortable to the patient and will be used with the BPAP device. [] The prescribed mask interface has been properly fit, is the most comfortable to the patient and will be used with the RAD.   o Current CPAP setting prevents patient from tolerating the therapy and lower CPAP settings fail to adequately control the symptoms of ISABELA, improve sleep quality, or reduce AHI to acceptable levels. [] Current CPAP setting prevent patient from tolerating the therapy and lower PAP settings fail to  adequately control ISABELA symptoms, improve sleep quality, or reduce AHI to acceptable levels.          [] There is significant improvement of the respiratory events on the RAD                                                                                                                                                                                                                                  Abril Coon MD               NPI- [id number]     Devon Patch [id number]                    12/19/22       ____________________________                        _______________________           Physician Signature                                                         Date                                                     Subjective   SUBJECTIVE/OBJECTIVE:  Transfer of care from [facility] to [facility]  Initially seen at [facility] pulmonary and sleep on 12/16/2021    Last seen at [facility] on 11/13/2020    Referred from Dr. Brynn Rocha On 2/24/17     Sleeping better  Less irritable     No heaache  No bloating  No chest pain  No burping  No ear popping     Pressure feels good     Going to bed 5 am and waking up at 10-11 am    Sinus ok     Not sleeping  Can't get mask    Having issues mask      Since last visit  Having some issues with pinched nerve and not able to use the stair  This is in the low back          Doing well    No snoring  No bloating  No burping  No chest pains  No ear popping    No RLS  No PLM's    Tolerating mask  Tolerating pressure  Pressures not causing any problems                Review of Systems   Constitutional: Negative. HENT: Negative. Eyes: Negative. Respiratory: Negative. Cardiovascular: Negative. Gastrointestinal: Negative. Endocrine: Negative. Genitourinary: Negative. Musculoskeletal: Negative. Skin: Negative. Allergic/Immunologic: Negative. Neurological: Negative. Hematological: Negative. Psychiatric/Behavioral: Negative. Vitals:    12/19/22 1116   BP: 110/72   Site: Left Upper Arm   Position: Sitting   Cuff Size: Medium Adult   Pulse: 82   Resp: 16   Temp: (!) 72.1 °F (22.3 °C)   TempSrc: Temporal   SpO2: 96%   Weight: (!) 339 lb (153.8 kg)   Height: 5' 4\" (1.626 m)       Objective   Physical Exam  Vitals and nursing note reviewed. Constitutional:       General: She is not in acute distress. Appearance: Normal appearance. She is not ill-appearing. HENT:      Head: Normocephalic and atraumatic. Right Ear: External ear normal.      Left Ear: External ear normal.      Nose: Nose normal.      Mouth/Throat:      Mouth: Mucous membranes are moist.      Pharynx: Oropharynx is clear. Comments: Mallampati 3  Eyes:      General: No scleral icterus. Extraocular Movements: Extraocular movements intact. Conjunctiva/sclera: Conjunctivae normal.      Pupils: Pupils are equal, round, and reactive to light. Cardiovascular:      Rate and Rhythm: Normal rate and regular rhythm. Pulses: Normal pulses. Heart sounds: Normal heart sounds. No murmur heard. No friction rub.    Pulmonary:      Effort: No respiratory distress. Breath sounds: No stridor. No wheezing, rhonchi or rales. Chest:      Chest wall: No tenderness. Abdominal:      General: Abdomen is flat. Bowel sounds are normal. There is no distension. Tenderness: There is no abdominal tenderness. There is no guarding. Musculoskeletal:         General: No swelling or tenderness. Normal range of motion. Cervical back: Normal range of motion and neck supple. No rigidity. Skin:     General: Skin is warm and dry. Coloration: Skin is not jaundiced. Neurological:      General: No focal deficit present. Mental Status: She is alert and oriented to person, place, and time. Mental status is at baseline. Cranial Nerves: No cranial nerve deficit. Sensory: No sensory deficit. Motor: No weakness. Gait: Gait normal.   Psychiatric:         Mood and Affect: Mood normal.         Thought Content:  Thought content normal.         Judgment: Judgment normal.                An electronic signature was used to authenticate this note.    --Elizabeth Street MD

## 2023-07-19 DIAGNOSIS — I10 PRIMARY HYPERTENSION: ICD-10-CM

## 2023-07-19 DIAGNOSIS — E78.2 MIXED HYPERLIPIDEMIA: ICD-10-CM

## 2023-07-19 RX ORDER — HYDROCHLOROTHIAZIDE 25 MG/1
TABLET ORAL
Qty: 90 TABLET | Refills: 0 | OUTPATIENT
Start: 2023-07-19

## 2023-07-19 RX ORDER — ATORVASTATIN CALCIUM 10 MG/1
10 TABLET, FILM COATED ORAL DAILY
Qty: 90 TABLET | Refills: 0 | Status: SHIPPED | OUTPATIENT
Start: 2023-07-19

## 2023-07-19 NOTE — TELEPHONE ENCOUNTER
Medication:   Requested Prescriptions     Pending Prescriptions Disp Refills    hydroCHLOROthiazide (HYDRODIURIL) 25 MG tablet [Pharmacy Med Name: hydroCHLOROthiazide 25 MG Oral Tablet] 90 tablet 0     Sig: Take 1 tablet by mouth once daily    atorvastatin (LIPITOR) 10 MG tablet [Pharmacy Med Name: Atorvastatin Calcium 10 MG Oral Tablet] 90 tablet 0     Sig: Take 1 tablet by mouth once daily       Last Filled:      Patient Phone Number: [phone] (home) [phone] (work)    Last appt: 12/14/2022   Next appt: 9/8/2023    Last Lipid:   Lab Results   Component Value Date/Time    CHOL 190 07/14/2022 12:22 PM    TRIG 97 07/14/2022 12:22 PM    HDL 77 07/14/2022 12:22 PM    [address] [facility] 94 07/14/2022 12:22 PM

## 2023-08-08 DIAGNOSIS — I10 PRIMARY HYPERTENSION: ICD-10-CM

## 2023-08-08 DIAGNOSIS — J30.2 CHRONIC SEASONAL ALLERGIC RHINITIS: ICD-10-CM

## 2023-08-09 RX ORDER — CETIRIZINE HYDROCHLORIDE 10 MG/1
TABLET ORAL
Qty: 60 TABLET | Refills: 0 | Status: SHIPPED | OUTPATIENT
Start: 2023-08-09

## 2023-08-09 RX ORDER — HYDROCHLOROTHIAZIDE 25 MG/1
TABLET ORAL
Qty: 90 TABLET | Refills: 0 | Status: SHIPPED | OUTPATIENT
Start: 2023-08-09

## 2023-08-09 NOTE — TELEPHONE ENCOUNTER
Medication:   Requested Prescriptions     Pending Prescriptions Disp Refills    hydroCHLOROthiazide (HYDRODIURIL) 25 MG tablet [Pharmacy Med Name: hydroCHLOROthiazide 25 MG Oral Tablet] 90 tablet 0     Sig: Take 1 tablet by mouth once daily    cetirizine (ZYRTEC) 10 MG tablet [Pharmacy Med Name: Cetirizine HCl 10 MG Oral Tablet] 60 tablet 0     Sig: Take 1 tablet by mouth once daily        Last Filled:      Patient Phone Number: [phone] (home) [phone] (work)    Last appt: 12/14/2022   Next appt: 9/8/2023    Last OARRS: No flowsheet data found.

## 2023-12-11 ENCOUNTER — OFFICE VISIT (OUTPATIENT)
Dept: PULMONOLOGY | Age: 68
End: 2023-12-11
Payer: MEDICARE

## 2023-12-11 VITALS
OXYGEN SATURATION: 98 % | HEIGHT: 64 IN | BODY MASS INDEX: 50.02 KG/M2 | DIASTOLIC BLOOD PRESSURE: 80 MMHG | HEART RATE: 74 BPM | RESPIRATION RATE: 16 BRPM | SYSTOLIC BLOOD PRESSURE: 132 MMHG | WEIGHT: 293 LBS

## 2023-12-11 DIAGNOSIS — G47.33 OBSTRUCTIVE SLEEP APNEA: Primary | ICD-10-CM

## 2023-12-11 DIAGNOSIS — E66.01 CLASS 3 SEVERE OBESITY WITHOUT SERIOUS COMORBIDITY WITH BODY MASS INDEX (BMI) OF 50.0 TO 59.9 IN ADULT, UNSPECIFIED OBESITY TYPE (HCC): ICD-10-CM

## 2023-12-11 DIAGNOSIS — G47.10 HYPERSOMNOLENCE: ICD-10-CM

## 2023-12-11 PROCEDURE — 99213 OFFICE O/P EST LOW 20 MIN: CPT | Performed by: INTERNAL MEDICINE

## 2023-12-11 PROCEDURE — G8400 PT W/DXA NO RESULTS DOC: HCPCS | Performed by: INTERNAL MEDICINE

## 2023-12-11 PROCEDURE — 1090F PRES/ABSN URINE INCON ASSESS: CPT | Performed by: INTERNAL MEDICINE

## 2023-12-11 PROCEDURE — G8417 CALC BMI ABV UP PARAM F/U: HCPCS | Performed by: INTERNAL MEDICINE

## 2023-12-11 PROCEDURE — 1036F TOBACCO NON-USER: CPT | Performed by: INTERNAL MEDICINE

## 2023-12-11 PROCEDURE — G8427 DOCREV CUR MEDS BY ELIG CLIN: HCPCS | Performed by: INTERNAL MEDICINE

## 2023-12-11 PROCEDURE — 1123F ACP DISCUSS/DSCN MKR DOCD: CPT | Performed by: INTERNAL MEDICINE

## 2023-12-11 PROCEDURE — G8484 FLU IMMUNIZE NO ADMIN: HCPCS | Performed by: INTERNAL MEDICINE

## 2023-12-11 PROCEDURE — 3017F COLORECTAL CA SCREEN DOC REV: CPT | Performed by: INTERNAL MEDICINE

## 2023-12-11 RX ORDER — IBUPROFEN 800 MG/1
800 TABLET ORAL EVERY 8 HOURS PRN
COMMUNITY
Start: 2023-11-16

## 2023-12-11 ASSESSMENT — ENCOUNTER SYMPTOMS
ALLERGIC/IMMUNOLOGIC NEGATIVE: 1
EYES NEGATIVE: 1
RESPIRATORY NEGATIVE: 1
GASTROINTESTINAL NEGATIVE: 1

## 2023-12-11 NOTE — PROGRESS NOTES
MA Communication:   The following orders are received by verbal communication from Hector Kilgore MD    Orders include:  1yr f/u, order to DME
Cervical back: Normal range of motion and neck supple. No rigidity. Skin:     General: Skin is warm and dry. Coloration: Skin is not jaundiced. Neurological:      General: No focal deficit present. Mental Status: She is alert and oriented to person, place, and time. Mental status is at baseline. Cranial Nerves: No cranial nerve deficit. Sensory: No sensory deficit. Motor: No weakness. Gait: Gait normal.   Psychiatric:         Mood and Affect: Mood normal.         Thought Content:  Thought content normal.         Judgment: Judgment normal.                  An electronic signature was used to authenticate this note.    --Jason Villavicencio MD

## 2023-12-11 NOTE — PATIENT INSTRUCTIONS
ASSESSMENT/PLAN:  1. Obstructive sleep apnea  2. Hypersomnolence  3. Class 3 severe obesity without serious comorbidity with body mass index (BMI) of 50.0 to 59.9 in adult, unspecified obesity type (HCC)        ISABELA  Sleep study done 3/31/17 showed ahi of 36  with wt of 286       autopap for her  Set at max of 20 And min of 5  Using nasal pillows  Ramp time is 25 min and start at 4 cwp  epr is at 3 all the time          Using 30/30 days  Using 6.2 hr/night  90% pressure is 10.7  No leak at 24 lpm  No sleep apnea, ahi 0.2  Has 93125 hrs     dme is [facility]    I have access via [employer]         Feeling the benefit of cpap use  Will continue    Will need supplies         Hypersomnolence  Doing better      Obesity    Weight loss and exercise are going to be important     Weight last year was 302 and then at 298 and them at 314 to 320 to 339 to 316    Back pain is better and using therapy          Vaccinations    covid vaccination +booster      RTC in 1 year  Remember to bring a list of pulmonary medications and any CPAP or BiPAP machines to your next appointment with the office. Please keep all of your future appointments scheduled by [facility]. Out of respect for other patients and providers, you may be asked to reschedule your appointment if you arrive later than your scheduled appointment time. Appointments cancelled less than 24hrs in advance will be considered a no show. Patients with three missed appointments within 1 year or four missed appointments within 2 years can be dismissed from the practice. Please be aware that our physicians are required to work in the Intensive Care Unit at [facility].  Your appointment may need to be rescheduled if they are designated to work during your appointment time. You may receive a survey regarding the care you received during your visit. Your input is valuable to us.   We encourage you to

## 2023-12-13 ENCOUNTER — HOSPITAL ENCOUNTER (OUTPATIENT)
Dept: MAMMOGRAPHY | Age: 68
Discharge: HOME | End: 2023-12-13
Payer: MEDICARE

## 2023-12-13 VITALS — BODY MASS INDEX: 50.02 KG/M2 | HEIGHT: 64 IN | WEIGHT: 293 LBS

## 2023-12-13 DIAGNOSIS — Z12.31 VISIT FOR SCREENING MAMMOGRAM: ICD-10-CM

## 2023-12-13 PROCEDURE — 77063 BREAST TOMOSYNTHESIS BI: CPT

## 2023-12-14 ENCOUNTER — OFFICE VISIT (OUTPATIENT)
Dept: PRIMARY CARE CLINIC | Age: 68
End: 2023-12-14

## 2023-12-14 VITALS
SYSTOLIC BLOOD PRESSURE: 146 MMHG | BODY MASS INDEX: 50.02 KG/M2 | HEART RATE: 86 BPM | RESPIRATION RATE: 19 BRPM | HEIGHT: 64 IN | DIASTOLIC BLOOD PRESSURE: 78 MMHG | OXYGEN SATURATION: 98 % | TEMPERATURE: 99.2 F | WEIGHT: 293 LBS

## 2023-12-14 DIAGNOSIS — Z13.1 DIABETES MELLITUS SCREENING: ICD-10-CM

## 2023-12-14 DIAGNOSIS — I10 PRIMARY HYPERTENSION: Primary | ICD-10-CM

## 2023-12-14 DIAGNOSIS — Z23 IMMUNIZATION DUE: ICD-10-CM

## 2023-12-14 DIAGNOSIS — Z78.0 POST-MENOPAUSAL: ICD-10-CM

## 2023-12-14 DIAGNOSIS — E78.2 MIXED HYPERLIPIDEMIA: ICD-10-CM

## 2023-12-14 DIAGNOSIS — Z11.59 ENCOUNTER FOR HEPATITIS C SCREENING TEST FOR LOW RISK PATIENT: ICD-10-CM

## 2023-12-14 PROBLEM — Z13.220 SCREENING CHOLESTEROL LEVEL: Status: RESOLVED | Noted: 2023-12-14 | Resolved: 2023-12-14

## 2023-12-14 PROBLEM — Z13.220 SCREENING CHOLESTEROL LEVEL: Status: ACTIVE | Noted: 2023-12-14

## 2023-12-14 RX ORDER — ATORVASTATIN CALCIUM 10 MG/1
10 TABLET, FILM COATED ORAL DAILY
Qty: 90 TABLET | Refills: 0 | Status: SHIPPED | OUTPATIENT
Start: 2023-12-14

## 2023-12-14 RX ORDER — CETIRIZINE HYDROCHLORIDE 10 MG/1
10 TABLET ORAL DAILY
Qty: 90 TABLET | Refills: 0 | Status: CANCELLED | OUTPATIENT
Start: 2023-12-14

## 2023-12-14 RX ORDER — ATORVASTATIN CALCIUM 10 MG/1
10 TABLET, FILM COATED ORAL DAILY
Qty: 90 TABLET | Refills: 0 | Status: CANCELLED | OUTPATIENT
Start: 2023-12-14

## 2023-12-14 RX ORDER — HYDROCHLOROTHIAZIDE 25 MG/1
25 TABLET ORAL DAILY
Qty: 90 TABLET | Refills: 0 | Status: CANCELLED | OUTPATIENT
Start: 2023-12-14

## 2023-12-14 RX ORDER — HYDROCHLOROTHIAZIDE 25 MG/1
25 TABLET ORAL DAILY
Qty: 90 TABLET | Refills: 0 | Status: SHIPPED | OUTPATIENT
Start: 2023-12-14

## 2023-12-14 SDOH — ECONOMIC STABILITY: INCOME INSECURITY: HOW HARD IS IT FOR YOU TO PAY FOR THE VERY BASICS LIKE FOOD, HOUSING, MEDICAL CARE, AND HEATING?: NOT HARD AT ALL

## 2023-12-14 SDOH — ECONOMIC STABILITY: FOOD INSECURITY: WITHIN THE PAST 12 MONTHS, YOU WORRIED THAT YOUR FOOD WOULD RUN OUT BEFORE YOU GOT MONEY TO BUY MORE.: NEVER TRUE

## 2023-12-14 SDOH — ECONOMIC STABILITY: HOUSING INSECURITY
IN THE LAST 12 MONTHS, WAS THERE A TIME WHEN YOU DID NOT HAVE A STEADY PLACE TO SLEEP OR SLEPT IN A SHELTER (INCLUDING NOW)?: NO

## 2023-12-14 SDOH — ECONOMIC STABILITY: FOOD INSECURITY: WITHIN THE PAST 12 MONTHS, THE FOOD YOU BOUGHT JUST DIDN'T LAST AND YOU DIDN'T HAVE MONEY TO GET MORE.: NEVER TRUE

## 2023-12-14 ASSESSMENT — PATIENT HEALTH QUESTIONNAIRE - PHQ9
SUM OF ALL RESPONSES TO PHQ9 QUESTIONS 1 & 2: 0
1. LITTLE INTEREST OR PLEASURE IN DOING THINGS: 0
SUM OF ALL RESPONSES TO PHQ QUESTIONS 1-9: 0
2. FEELING DOWN, DEPRESSED OR HOPELESS: 0
SUM OF ALL RESPONSES TO PHQ QUESTIONS 1-9: 0

## 2023-12-14 NOTE — PATIENT INSTRUCTIONS
Consider tetanus vaccine at pharmacy as well as RSV vaccine. Please find our [facility] below for your convenience! CENTRAL LOCATIONS    1) [address] [facility], [address] [facility], [address] [facility] [address]  Phone: [phone]    2) [address]  [place], [address] [place] [address]  Phone: [address]    3) [address] [facility]  [place], [address]  Phone: [phone]    1) [facility]  [address] [facility] [address], [address]  Phone: [phone]    5) [facility] [address]  [place], [address]  Phone: [phone] [facility]    6) [address] [facility] [address] [place] [address], Suite. [facility], [address]  Phone: [phone]    7) [address] [facility] [address] [facility] [address] [facility], [address]  Phone: [phone]) [facility] S[facility]TH  [address]  [place], [address]  Phone: [phone]) [facility]  [address] [facility], 1 [facility] [address]. [address]  Phone: 2018 [facility]    10) [facility]  [address], [address], [address] [facility]  Phone: [phone]) [address] [facility]  3/3/2001 [address] [facility], [address] [facility],  [address], [address] [facility]  Phone: [address] [place]    12) [address] [facility]  [address] [facility], [address] [facility]  Phone: [phone]    13) [facility]  [address] \[facility]\""  Phone: [phone]    For the DEXA (bone density) please call [phone] to schedule:    1) [facility]  [address], [address]    2) [facility]  [address] [facility], [address]    3) [facility]  [address], Suite [facility], [address]    4) [address]  [address] [facility]  [address] [place] [address], [place]

## 2023-12-14 NOTE — ASSESSMENT & PLAN NOTE
Poorly controlled, asymptomatic. Medication refilled. Scheduled for annual in 2 weeks. Call back/ED precautions discussed.     Blood Pressure Goal:  [ ] < 130/80 (ACC/AHA)  [x] < 140/90 (JNC-8 for age < 61, or CKD, or DM)  [ ] < 150/90 (JNC-8 for age > 61)

## 2023-12-26 DIAGNOSIS — Z11.59 ENCOUNTER FOR HEPATITIS C SCREENING TEST FOR LOW RISK PATIENT: ICD-10-CM

## 2023-12-26 DIAGNOSIS — Z13.1 DIABETES MELLITUS SCREENING: ICD-10-CM

## 2023-12-26 DIAGNOSIS — E78.2 MIXED HYPERLIPIDEMIA: ICD-10-CM

## 2023-12-26 LAB
ALBUMIN SERPL-MCNC: 4.4 G/DL (ref 3.4–5)
ALBUMIN/GLOB SERPL: 1.4 {RATIO} (ref 1.1–2.2)
ALP SERPL-CCNC: 100 U/L (ref 40–129)
ALT SERPL-CCNC: 12 U/L (ref 10–40)
ANION GAP SERPL CALCULATED.3IONS-SCNC: 9 MMOL/L (ref 3–16)
AST SERPL-CCNC: 13 U/L (ref 15–37)
BILIRUB SERPL-MCNC: 0.4 MG/DL (ref 0–1)
BUN SERPL-MCNC: 14 MG/DL (ref 7–20)
CALCIUM SERPL-MCNC: 10 MG/DL (ref 8.3–10.6)
CHLORIDE SERPL-SCNC: 103 MMOL/L (ref 99–110)
CHOLEST SERPL-MCNC: 166 MG/DL (ref 0–199)
CO2 SERPL-SCNC: 31 MMOL/L (ref 21–32)
CREAT SERPL-MCNC: 0.9 MG/DL (ref 0.6–1.2)
GFR SERPLBLD CREATININE-BSD FMLA CKD-EPI: >60 ML/MIN/{1.73_M2}
GLUCOSE SERPL-MCNC: 94 MG/DL (ref 70–99)
HCV AB SERPL QL IA: NORMAL
HDLC SERPL-MCNC: 66 MG/DL (ref 40–60)
LDLC SERPL CALC-MCNC: 83 MG/DL
POTASSIUM SERPL-SCNC: 4 MMOL/L (ref 3.5–5.1)
PROT SERPL-MCNC: 7.5 G/DL (ref 6.4–8.2)
SODIUM SERPL-SCNC: 143 MMOL/L (ref 136–145)
TRIGL SERPL-MCNC: 85 MG/DL (ref 0–150)
VLDLC SERPL CALC-MCNC: 17 MG/DL

## 2023-12-28 ENCOUNTER — OFFICE VISIT (OUTPATIENT)
Dept: PRIMARY CARE CLINIC | Age: 68
End: 2023-12-28
Payer: MEDICARE

## 2023-12-28 VITALS
BODY MASS INDEX: 50.02 KG/M2 | OXYGEN SATURATION: 95 % | SYSTOLIC BLOOD PRESSURE: 132 MMHG | RESPIRATION RATE: 18 BRPM | TEMPERATURE: 97.2 F | DIASTOLIC BLOOD PRESSURE: 70 MMHG | HEART RATE: 72 BPM | HEIGHT: 64 IN | WEIGHT: 293 LBS

## 2023-12-28 DIAGNOSIS — D72.829 LEUKOCYTOSIS, UNSPECIFIED TYPE: ICD-10-CM

## 2023-12-28 DIAGNOSIS — I10 PRIMARY HYPERTENSION: ICD-10-CM

## 2023-12-28 DIAGNOSIS — Z23 IMMUNIZATION DUE: ICD-10-CM

## 2023-12-28 DIAGNOSIS — J30.2 CHRONIC SEASONAL ALLERGIC RHINITIS: ICD-10-CM

## 2023-12-28 DIAGNOSIS — E78.2 MIXED HYPERLIPIDEMIA: ICD-10-CM

## 2023-12-28 DIAGNOSIS — Z00.00 HEALTHCARE MAINTENANCE: Primary | ICD-10-CM

## 2023-12-28 PROBLEM — Z11.59 ENCOUNTER FOR HEPATITIS C SCREENING TEST FOR LOW RISK PATIENT: Status: RESOLVED | Noted: 2023-12-14 | Resolved: 2023-12-28

## 2023-12-28 PROBLEM — Z13.1 DIABETES MELLITUS SCREENING: Status: RESOLVED | Noted: 2023-12-14 | Resolved: 2023-12-28

## 2023-12-28 PROCEDURE — 90677 PCV20 VACCINE IM: CPT | Performed by: FAMILY MEDICINE

## 2023-12-28 PROCEDURE — 1123F ACP DISCUSS/DSCN MKR DOCD: CPT | Performed by: FAMILY MEDICINE

## 2023-12-28 PROCEDURE — 3078F DIAST BP <80 MM HG: CPT | Performed by: FAMILY MEDICINE

## 2023-12-28 PROCEDURE — G0009 ADMIN PNEUMOCOCCAL VACCINE: HCPCS | Performed by: FAMILY MEDICINE

## 2023-12-28 PROCEDURE — G0439 PPPS, SUBSEQ VISIT: HCPCS | Performed by: FAMILY MEDICINE

## 2023-12-28 PROCEDURE — 3017F COLORECTAL CA SCREEN DOC REV: CPT | Performed by: FAMILY MEDICINE

## 2023-12-28 PROCEDURE — G8484 FLU IMMUNIZE NO ADMIN: HCPCS | Performed by: FAMILY MEDICINE

## 2023-12-28 PROCEDURE — 3075F SYST BP GE 130 - 139MM HG: CPT | Performed by: FAMILY MEDICINE

## 2023-12-28 RX ORDER — CETIRIZINE HYDROCHLORIDE 10 MG/1
10 TABLET ORAL DAILY
Qty: 90 TABLET | Refills: 0 | Status: CANCELLED | OUTPATIENT
Start: 2023-12-28

## 2023-12-28 RX ORDER — CETIRIZINE HYDROCHLORIDE 10 MG/1
10 TABLET ORAL DAILY
Qty: 90 TABLET | Refills: 0 | Status: SHIPPED | OUTPATIENT
Start: 2023-12-28 | End: 2024-03-27

## 2023-12-28 RX ORDER — ATORVASTATIN CALCIUM 10 MG/1
10 TABLET, FILM COATED ORAL DAILY
Qty: 90 TABLET | Refills: 0 | Status: SHIPPED | OUTPATIENT
Start: 2023-12-28

## 2023-12-28 RX ORDER — HYDROCHLOROTHIAZIDE 25 MG/1
25 TABLET ORAL DAILY
Qty: 90 TABLET | Refills: 0 | Status: SHIPPED | OUTPATIENT
Start: 2023-12-28

## 2023-12-28 NOTE — PATIENT INSTRUCTIONS
Consider getting the tetanus vaccine at your pharmacy. Reminder: Please call to schedule dental exam when able. Zevia soda. Lifestyle modifications discussed today:    Exercise: In accordance with AHA/ACC guidelines:    - Get at least 150 minutes per week of moderate-intensity aerobic activity OR 75 minutes per week of vigorous aerobic activity, OR a combination of both, preferably spread throughout the week. - Add moderate- to high-intensity muscle-strengthening activity (such as resistance or weights) on at least 2 days per week. - Gain even more benefits by being active at least 300 minutes (5 hours) per week. Increase amount and intensity gradually over time. Diet:    Dietary pattern should consist of vegetables, fruits and whole grains, low-fat dairy (or no dairy if intolerant), suggested poultry and fish. Consider legumes, vegetable oils and nuts and limiting intake of sweets, sugar, sweetened beverages and red meat. Aim is to have 5% of calories from saturated fats or less and no trans fat. Can consider sodium reduction IF elevated blood pressure/hypertension to no more than 2.4g daily to start, with eventual goal to 1.5g if so. To schedule your bone density test: (DEXA) at any of our five imaging locations below, please call [phone]:    1) [facility]  [address], [address]    2) [facility]  [address] [facility], [address] [place] [address] Drive    3) [facility]  [address], [address] [facility], [address]    4) [address]  [address], [address] [place], [address]    5) [address] [facility]  [address], 3 [facility], [address]*  (Call 211 if need more resources.)       Non-Emergency Transportation: Non-emergency medical transportation is for Medicaid members who do not have access to

## 2023-12-28 NOTE — ASSESSMENT & PLAN NOTE
Well controlled, asymptomatic. Medication refilled. Will need BMP in 1 month - ordered and reminder set. Call back/ED precautions discussed.     Blood Pressure Goal:  [ ] < 130/80 (ACC/AHA)  [x] < 140/90 (JNC-8 for age < 61, or CKD, or DM)  [ ] < 150/90 (JNC-8 for age > 61)

## 2023-12-28 NOTE — ASSESSMENT & PLAN NOTE
Overall doing okay. Age/risk appropriate immunization provided as per orders below. Counseled briefly and provided written materials on recommendations for exercise and healthy diet. Reminded to scheduled DEXA, she declined our help with this today. See other problems addressed today as otherwise noted.

## 2024-01-27 PROBLEM — Z00.00 HEALTHCARE MAINTENANCE: Status: RESOLVED | Noted: 2023-12-28 | Resolved: 2024-01-27

## 2024-01-29 ENCOUNTER — TELEPHONE (OUTPATIENT)
Dept: PRIMARY CARE CLINIC | Age: 69
End: 2024-01-29

## 2024-01-29 NOTE — TELEPHONE ENCOUNTER
----- Message from Carmelita Vasques MD sent at 1/29/2024  7:45 AM EST -----  Help with this please? Thank you!  ----- Message -----  From: Carmelita Vasques MD  Sent: 1/29/2024   7:00 AM EST  To: Carmelita Vasques MD    Call to remind to do BMP and CBC ordered and in chart - not fasting and water encouraged. Please and thank you!

## 2024-02-14 DIAGNOSIS — D72.829 LEUKOCYTOSIS, UNSPECIFIED TYPE: ICD-10-CM

## 2024-02-14 DIAGNOSIS — I10 PRIMARY HYPERTENSION: ICD-10-CM

## 2024-02-15 LAB
ANION GAP SERPL CALCULATED.3IONS-SCNC: 15 MMOL/L (ref 3–16)
BASOPHILS # BLD: 0.2 K/UL (ref 0–0.2)
BASOPHILS NFR BLD: 2 %
BUN SERPL-MCNC: 13 MG/DL (ref 7–20)
CALCIUM SERPL-MCNC: 10.1 MG/DL (ref 8.3–10.6)
CHLORIDE SERPL-SCNC: 102 MMOL/L (ref 99–110)
CO2 SERPL-SCNC: 24 MMOL/L (ref 21–32)
CREAT SERPL-MCNC: 0.9 MG/DL (ref 0.6–1.2)
DEPRECATED RDW RBC AUTO: 14.3 % (ref 12.4–15.4)
EOSINOPHIL # BLD: 0.3 K/UL (ref 0–0.6)
EOSINOPHIL NFR BLD: 3 %
GFR SERPLBLD CREATININE-BSD FMLA CKD-EPI: >60 ML/MIN/{1.73_M2}
GLUCOSE SERPL-MCNC: 90 MG/DL (ref 70–99)
HCT VFR BLD AUTO: 41.4 % (ref 36–48)
HGB BLD-MCNC: 13.5 G/DL (ref 12–16)
LYMPHOCYTES # BLD: 2.5 K/UL (ref 1–5.1)
LYMPHOCYTES NFR BLD: 22 %
MCH RBC QN AUTO: 28.3 PG (ref 26–34)
MCHC RBC AUTO-ENTMCNC: 32.7 G/DL (ref 31–36)
MCV RBC AUTO: 86.6 FL (ref 80–100)
MONOCYTES # BLD: 1.1 K/UL (ref 0–1.3)
MONOCYTES NFR BLD: 10 %
NEUTROPHILS # BLD: 7.2 K/UL (ref 1.7–7.7)
NEUTROPHILS NFR BLD: 53 %
NEUTS BAND NFR BLD MANUAL: 10 % (ref 0–7)
PLATELET # BLD AUTO: 270 K/UL (ref 135–450)
PLATELET BLD QL SMEAR: ADEQUATE
PMV BLD AUTO: 11 FL (ref 5–10.5)
POTASSIUM SERPL-SCNC: 3.7 MMOL/L (ref 3.5–5.1)
RBC # BLD AUTO: 4.78 M/UL (ref 4–5.2)
RBC MORPH BLD: NORMAL
SLIDE REVIEW: ABNORMAL
SODIUM SERPL-SCNC: 141 MMOL/L (ref 136–145)
WBC # BLD AUTO: 11.4 K/UL (ref 4–11)

## 2024-03-04 ENCOUNTER — TELEPHONE (OUTPATIENT)
Dept: PRIMARY CARE CLINIC | Age: 69
End: 2024-03-04

## 2024-03-04 NOTE — TELEPHONE ENCOUNTER
----- Message from Carmelita Vasques MD sent at 3/4/2024  7:22 AM EST -----  This patient went to ED over the weekend for chest pain. Can you call to check in and see if anything is needed on our end please (ED f/u visit, referral etc). Thank you!

## 2024-04-08 ENCOUNTER — TELEMEDICINE (OUTPATIENT)
Dept: PRIMARY CARE CLINIC | Age: 69
End: 2024-04-08
Payer: MEDICARE

## 2024-04-08 DIAGNOSIS — R73.09 ELEVATED HEMOGLOBIN A1C: ICD-10-CM

## 2024-04-08 DIAGNOSIS — R53.83 OTHER FATIGUE: Primary | ICD-10-CM

## 2024-04-08 PROCEDURE — G8400 PT W/DXA NO RESULTS DOC: HCPCS | Performed by: FAMILY MEDICINE

## 2024-04-08 PROCEDURE — 99214 OFFICE O/P EST MOD 30 MIN: CPT | Performed by: FAMILY MEDICINE

## 2024-04-08 PROCEDURE — G8417 CALC BMI ABV UP PARAM F/U: HCPCS | Performed by: FAMILY MEDICINE

## 2024-04-08 PROCEDURE — 1123F ACP DISCUSS/DSCN MKR DOCD: CPT | Performed by: FAMILY MEDICINE

## 2024-04-08 PROCEDURE — 1036F TOBACCO NON-USER: CPT | Performed by: FAMILY MEDICINE

## 2024-04-08 PROCEDURE — 1090F PRES/ABSN URINE INCON ASSESS: CPT | Performed by: FAMILY MEDICINE

## 2024-04-08 PROCEDURE — G8427 DOCREV CUR MEDS BY ELIG CLIN: HCPCS | Performed by: FAMILY MEDICINE

## 2024-04-08 PROCEDURE — 3017F COLORECTAL CA SCREEN DOC REV: CPT | Performed by: FAMILY MEDICINE

## 2024-04-08 ASSESSMENT — ENCOUNTER SYMPTOMS
DIARRHEA: 1
VOMITING: 0
BLOOD IN STOOL: 0
COUGH: 0
SHORTNESS OF BREATH: 0
NAUSEA: 0
COLOR CHANGE: 0
ABDOMINAL PAIN: 0
CHEST TIGHTNESS: 0

## 2024-04-08 ASSESSMENT — PATIENT HEALTH QUESTIONNAIRE - PHQ9
SUM OF ALL RESPONSES TO PHQ QUESTIONS 1-9: 0
SUM OF ALL RESPONSES TO PHQ9 QUESTIONS 1 & 2: 0
2. FEELING DOWN, DEPRESSED OR HOPELESS: NOT AT ALL
SUM OF ALL RESPONSES TO PHQ QUESTIONS 1-9: 0
1. LITTLE INTEREST OR PLEASURE IN DOING THINGS: NOT AT ALL

## 2024-04-08 NOTE — PATIENT INSTRUCTIONS
Please find our [facility] Lab Location Guide below for your convenience!    CENTRAL LOCATIONS    1) [facility]  [address] [facility]  [place], [place] [address]  Phone: [phone]    2) [facility]  [address] [place], [place] [address]  Phone: [phone]    [facility] LOCATIONS    3) [facility]  [address] [facility]  [place], [place] [address]  Phone: [phone]    4) [facility]  [address] [place] [address]  Phone: [phone]    5) [facility]  [address] [place] [address]  Phone: [phone]    [place] LOCATIONS    6) [facility]  [address] [facility], [address]  [place] [address]  Phone: [phone]    7) [facility]  [address] [place] [address]  Phone: [phone]    8) [facility]  [address] [address]  Phone: [phone]    9) [facility]  [address] [facility]  [place] [address]  Phone: [phone]    [place] LOCATIONS    10) [facility]  [address], [address]  [place] [address]  Phone: [phone]    11) [facility]  3[address] [facility] [address]  [place] [address]  Phone: [phone]    [facility] LOCATIONS    12) [facility]  [address] [facility] [address], [address]  [place] [address]  Phone: [phone]    13) [facility]  [address] [place] [address]  Phone: [phone]

## 2024-04-08 NOTE — ASSESSMENT & PLAN NOTE
Age/risk appropriate screening provided as per orders below.   [de-identified] : Recent hospital admission for acute appendicitis.  Underwent uneventful Lap Appendectomy and discharged home the following afternoon.

## 2024-04-08 NOTE — PROGRESS NOTES
Smokeless tobacco: Never   Vaping Use    Vaping Use: Never used   Substance Use Topics    Alcohol use: No    Drug use: No     Family History   Problem Relation Age of Onset    Heart Disease Father     Breast Cancer Neg Hx     Colon Cancer Neg Hx      Past Surgical History:   Procedure Laterality Date    ANKLE SURGERY      right-broken bones    BREAST BIOPSY      benign     SECTION  ,     baby head too large, heart rate dropped    OTHER SURGICAL HISTORY  1996    sweat glands infected       Current Outpatient Medications:     hydroCHLOROthiazide (HYDRODIURIL) 25 MG tablet, Take 1 tablet by mouth daily, Disp: 90 tablet, Rfl: 0    atorvastatin (LIPITOR) 10 MG tablet, Take 1 tablet by mouth daily Take 1 tablet by mouth once daily, Disp: 90 tablet, Rfl: 0    ibuprofen (ADVIL;MOTRIN) 800 MG tablet, Take 1 tablet by mouth every 8 hours as needed for Pain, Disp: , Rfl:     Azelastine HCl 137 MCG/SPRAY SOLN, 2 SPRAYS BY NASAL ROUTE 2 TIMES DAILY, Disp: 90 mL, Rfl: 11    ipratropium (ATROVENT) 0.03 % nasal spray, , Disp: , Rfl:   No Known Allergies    Review of Systems:  Review of Systems   Constitutional:  Positive for chills and fatigue. Negative for diaphoresis (denied night sweats), fever and unexpected weight change (denied weight loss).   Respiratory:  Negative for cough, chest tightness and shortness of breath.    Cardiovascular:  Negative for chest pain.   Gastrointestinal:  Positive for diarrhea (last week - no blood in stool). Negative for abdominal pain, blood in stool, nausea and vomiting.   Endocrine: Positive for cold intolerance (occasional).   Genitourinary:  Negative for difficulty urinating, dysuria and hematuria.   Skin:  Negative for color change and rash.   Neurological:  Negative for dizziness and syncope.   Psychiatric/Behavioral:  Negative for dysphoric mood and sleep disturbance.      Objective:    Physical Exam:  There were no vitals filed for this visit.  Physical

## 2024-04-08 NOTE — ASSESSMENT & PLAN NOTE
Poorly controlled, new problem, unclear etiology, needing work-up. DDx includes cardiac (especially given fam hx), endocrine, anemia, ISABELA in poor control. The latter is less likely as she reports adherence with CPAP and close f/u with this and better sleep. Will check CBC as well TSH and screen for DM2. Strongly encouraged to get stress test/cardiac eval with her cardiologist - she stated she would. Also strongly encouraged to complete/update colorectal CA screening. Reminder set to check in 2 weeks from now to ensure done and f/u on sx. Less likely but on DDx is mono infection given recent ?viral sx. Call back/ED precautions discussed.

## 2024-04-12 DIAGNOSIS — R73.09 ELEVATED HEMOGLOBIN A1C: ICD-10-CM

## 2024-04-12 DIAGNOSIS — R53.83 OTHER FATIGUE: ICD-10-CM

## 2024-04-13 LAB
ALBUMIN SERPL-MCNC: 4.2 G/DL (ref 3.4–5)
ALBUMIN/GLOB SERPL: 1.4 {RATIO} (ref 1.1–2.2)
ALP SERPL-CCNC: 112 U/L (ref 40–129)
ALT SERPL-CCNC: 15 U/L (ref 10–40)
ANION GAP SERPL CALCULATED.3IONS-SCNC: 15 MMOL/L (ref 3–16)
AST SERPL-CCNC: 15 U/L (ref 15–37)
BASOPHILS # BLD: 0 K/UL (ref 0–0.2)
BASOPHILS NFR BLD: 0.5 %
BILIRUB SERPL-MCNC: 0.4 MG/DL (ref 0–1)
BUN SERPL-MCNC: 11 MG/DL (ref 7–20)
CALCIUM SERPL-MCNC: 10.2 MG/DL (ref 8.3–10.6)
CHLORIDE SERPL-SCNC: 100 MMOL/L (ref 99–110)
CO2 SERPL-SCNC: 27 MMOL/L (ref 21–32)
CREAT SERPL-MCNC: 0.9 MG/DL (ref 0.6–1.2)
DEPRECATED RDW RBC AUTO: 15 % (ref 12.4–15.4)
EOSINOPHIL # BLD: 0.2 K/UL (ref 0–0.6)
EOSINOPHIL NFR BLD: 2.4 %
EST. AVERAGE GLUCOSE BLD GHB EST-MCNC: 119.8 MG/DL
GFR SERPLBLD CREATININE-BSD FMLA CKD-EPI: 69 ML/MIN/{1.73_M2}
GLUCOSE SERPL-MCNC: 86 MG/DL (ref 70–99)
HBA1C MFR BLD: 5.8 %
HCT VFR BLD AUTO: 39.8 % (ref 36–48)
HGB BLD-MCNC: 13 G/DL (ref 12–16)
LYMPHOCYTES # BLD: 1.9 K/UL (ref 1–5.1)
LYMPHOCYTES NFR BLD: 21 %
MCH RBC QN AUTO: 28.3 PG (ref 26–34)
MCHC RBC AUTO-ENTMCNC: 32.7 G/DL (ref 31–36)
MCV RBC AUTO: 86.3 FL (ref 80–100)
MONOCYTES # BLD: 0.6 K/UL (ref 0–1.3)
MONOCYTES NFR BLD: 6.1 %
NEUTROPHILS # BLD: 6.3 K/UL (ref 1.7–7.7)
NEUTROPHILS NFR BLD: 70 %
PLATELET # BLD AUTO: 258 K/UL (ref 135–450)
PMV BLD AUTO: 10.3 FL (ref 5–10.5)
POTASSIUM SERPL-SCNC: 3.8 MMOL/L (ref 3.5–5.1)
PROT SERPL-MCNC: 7.3 G/DL (ref 6.4–8.2)
RBC # BLD AUTO: 4.6 M/UL (ref 4–5.2)
SLIDE REVIEW: NORMAL
SODIUM SERPL-SCNC: 142 MMOL/L (ref 136–145)
TSH SERPL DL<=0.005 MIU/L-ACNC: 1.46 UIU/ML (ref 0.27–4.2)
WBC # BLD AUTO: 9 K/UL (ref 4–11)

## 2024-05-31 DIAGNOSIS — J30.2 SEASONAL ALLERGIC RHINITIS, UNSPECIFIED TRIGGER: ICD-10-CM

## 2024-05-31 RX ORDER — CETIRIZINE HYDROCHLORIDE 10 MG/1
10 TABLET ORAL DAILY
COMMUNITY
End: 2024-05-31 | Stop reason: SDUPTHER

## 2024-05-31 NOTE — TELEPHONE ENCOUNTER
Azelastine HCl 137 MCG/SPRAY SOLN        cetirizine (ZYRTEC) 10 MG tablet        LOV 4/8/24  NOV 7/12/24    [facility] pharmacy

## 2024-06-03 RX ORDER — AZELASTINE HYDROCHLORIDE 137 UG/1
SPRAY, METERED NASAL
Qty: 90 ML | Refills: 11 | Status: SHIPPED | OUTPATIENT
Start: 2024-06-03

## 2024-06-03 RX ORDER — CETIRIZINE HYDROCHLORIDE 10 MG/1
10 TABLET ORAL DAILY
Qty: 30 TABLET | Refills: 3 | Status: SHIPPED | OUTPATIENT
Start: 2024-06-03

## 2024-07-12 ENCOUNTER — OFFICE VISIT (OUTPATIENT)
Dept: PRIMARY CARE CLINIC | Age: 69
End: 2024-07-12

## 2024-07-12 VITALS
OXYGEN SATURATION: 98 % | HEIGHT: 64 IN | RESPIRATION RATE: 16 BRPM | TEMPERATURE: 98.6 F | BODY MASS INDEX: 49.68 KG/M2 | SYSTOLIC BLOOD PRESSURE: 138 MMHG | HEART RATE: 72 BPM | DIASTOLIC BLOOD PRESSURE: 84 MMHG | WEIGHT: 291 LBS

## 2024-07-12 DIAGNOSIS — R73.09 ELEVATED HEMOGLOBIN A1C: ICD-10-CM

## 2024-07-12 DIAGNOSIS — I10 PRIMARY HYPERTENSION: Primary | ICD-10-CM

## 2024-07-12 LAB
CREAT UR-MCNC: 67.5 MG/DL (ref 28–259)
MICROALBUMIN UR DL<=1MG/L-MCNC: <1.2 MG/DL
MICROALBUMIN/CREAT UR: NORMAL MG/G (ref 0–30)

## 2024-07-12 SDOH — ECONOMIC STABILITY: FOOD INSECURITY: WITHIN THE PAST 12 MONTHS, THE FOOD YOU BOUGHT JUST DIDN'T LAST AND YOU DIDN'T HAVE MONEY TO GET MORE.: NEVER TRUE

## 2024-07-12 SDOH — ECONOMIC STABILITY: INCOME INSECURITY: HOW HARD IS IT FOR YOU TO PAY FOR THE VERY BASICS LIKE FOOD, HOUSING, MEDICAL CARE, AND HEATING?: NOT VERY HARD

## 2024-07-12 SDOH — ECONOMIC STABILITY: FOOD INSECURITY: WITHIN THE PAST 12 MONTHS, YOU WORRIED THAT YOUR FOOD WOULD RUN OUT BEFORE YOU GOT MONEY TO BUY MORE.: NEVER TRUE

## 2024-07-12 ASSESSMENT — ANXIETY QUESTIONNAIRES
6. BECOMING EASILY ANNOYED OR IRRITABLE: NOT AT ALL
2. NOT BEING ABLE TO STOP OR CONTROL WORRYING: NOT AT ALL
1. FEELING NERVOUS, ANXIOUS, OR ON EDGE: NOT AT ALL
3. WORRYING TOO MUCH ABOUT DIFFERENT THINGS: NOT AT ALL
IF YOU CHECKED OFF ANY PROBLEMS ON THIS QUESTIONNAIRE, HOW DIFFICULT HAVE THESE PROBLEMS MADE IT FOR YOU TO DO YOUR WORK, TAKE CARE OF THINGS AT HOME, OR GET ALONG WITH OTHER PEOPLE: NOT DIFFICULT AT ALL
5. BEING SO RESTLESS THAT IT IS HARD TO SIT STILL: NOT AT ALL
7. FEELING AFRAID AS IF SOMETHING AWFUL MIGHT HAPPEN: NOT AT ALL
GAD7 TOTAL SCORE: 0
4. TROUBLE RELAXING: NOT AT ALL

## 2024-07-12 ASSESSMENT — ENCOUNTER SYMPTOMS
VOMITING: 0
ABDOMINAL PAIN: 0
SHORTNESS OF BREATH: 0
COUGH: 0
NAUSEA: 0
DIARRHEA: 0
COLOR CHANGE: 0
BLOOD IN STOOL: 0

## 2024-07-12 ASSESSMENT — PATIENT HEALTH QUESTIONNAIRE - PHQ9
2. FEELING DOWN, DEPRESSED OR HOPELESS: NOT AT ALL
1. LITTLE INTEREST OR PLEASURE IN DOING THINGS: NOT AT ALL
SUM OF ALL RESPONSES TO PHQ9 QUESTIONS 1 & 2: 0
SUM OF ALL RESPONSES TO PHQ QUESTIONS 1-9: 0

## 2024-07-12 NOTE — ASSESSMENT & PLAN NOTE
Poorly controlled last check, will repeat today. Will also check urine microalbumin to be sure. Sleep hygiene sheet provided and encouraged/reminded her to get her colonoscopy done. Labs UTD and she follows with sleep medicine and therapist, ?if fatigue 2/2 to rising HgbA1c.

## 2024-07-12 NOTE — PATIENT INSTRUCTIONS
Reminder: Please call to schedule colonoscopy exam when able.    Please find our [facility] Lab Location Guide below for your convenience!    CENTRAL LOCATIONS    1) [facility]  [address]  [place], [place] [address]  Phone: [phone]    2) [facility]  [address] [place], [place] [address]  Phone: [phone]    [facility] LOCATIONS    3) [facility]  [address]  [place], [place] [address]  Phone: [phone]    4) [facility]  [address] [place] [address]  Phone: [phone]    5) [facility]  [address] [place] [address]  Phone: [phone]    6) [facility]  [address]  [place] [address]   Phone: [phone]    [place] LOCATIONS    7) [facility]  [address] [address]  [place] [address]  Phone: [phone]    8) [facility]  [address] [place] [address]  Phone: [phone]    9) [facility]  [address] [place] [address]  Phone: [phone]    10) [facility]  [address]  [place] [address]  Phone: [phone]    [place] LOCATIONS    11) [facility]  [address]  [place] [address]  Phone: [phone]    12) [facility]  3[address] [facility]  [place] [address]  Phone: [phone]    [facility] LOCATIONS    13) [facility]  [address] [facility] [address]  [place] [address]  Phone: [phone]    14) [facility]  [address] [place] [address]  Phone: [phone]        Improving Sleep Through Behavior Change  Stimulus Control Procedures  Go to Bed Only When You Are Sleepy  The longer you are in bed, the more the bed is associated with a place to be awake instead of asleep. Delay bedtime until sleepy.     Get Out of Bed When You

## 2024-07-12 NOTE — PROGRESS NOTES
Kady Hope is a 68 y.o. year old female here for:    Chief Complaint:    Chief Complaint   Patient presents with    Hypertension     Subjective:    Today, her current concerns include:    HPI:  #HTN  - Onset: Years  - Quality: Asymptomatic  - Timing/Duration: Constant and daily condition  - Progression: Stable  - Modifiers: On HCTZ 25 mg QD - tolerating well and reports adherence    - Associated Symptoms: Per ROS as otherwise stated in this note    Past Medical History:   Diagnosis Date    Allergic rhinitis     Chronic back pain 2016    Hidradenitis suppurativa     Hyperlipidemia     Hypertension     Leukocytosis     Obesity     ISABELA (obstructive sleep apnea)     uses CPAP nightly    Spondylosis of lumbar region without myelopathy or radiculopathy 2016     Social History     Tobacco Use    Smoking status: Never    Smokeless tobacco: Never   Vaping Use    Vaping Use: Never used   Substance Use Topics    Alcohol use: No    Drug use: No     Family History   Problem Relation Age of Onset    Heart Disease Father     Breast Cancer Neg Hx     Colon Cancer Neg Hx      Past Surgical History:   Procedure Laterality Date    ANKLE SURGERY      right-broken bones    BREAST BIOPSY      benign     SECTION  ,     baby head too large, heart rate dropped    OTHER SURGICAL HISTORY  1996    sweat glands infected       Current Outpatient Medications:     Azelastine HCl 137 MCG/SPRAY SOLN, 2 SPRAYS BY NASAL ROUTE 2 TIMES DAILY, Disp: 90 mL, Rfl: 11    cetirizine (ZYRTEC) 10 MG tablet, Take 1 tablet by mouth daily, Disp: 30 tablet, Rfl: 3    hydroCHLOROthiazide (HYDRODIURIL) 25 MG tablet, Take 1 tablet by mouth daily, Disp: 90 tablet, Rfl: 0    atorvastatin (LIPITOR) 10 MG tablet, Take 1 tablet by mouth daily Take 1 tablet by mouth once daily, Disp: 90 tablet, Rfl: 0    ibuprofen (ADVIL;MOTRIN) 800 MG tablet, Take 1 tablet by mouth every 8 hours as needed for Pain, Disp: , Rfl:

## 2024-07-12 NOTE — ASSESSMENT & PLAN NOTE
Well controlled, asymptomatic. Should continue current regimen. Call back/ED precautions discussed.    Blood Pressure Goal:  [ ] < 130/80 (ACC/AHA)  [x] < 140/90 (JNC-8 for age < 60, or CKD, or DM)  [ ] < 150/90 (JNC-8 for age > 60)

## 2024-07-13 LAB
EST. AVERAGE GLUCOSE BLD GHB EST-MCNC: 114 MG/DL
HBA1C MFR BLD: 5.6 %

## 2024-07-17 ENCOUNTER — TELEPHONE (OUTPATIENT)
Dept: PRIMARY CARE CLINIC | Age: 69
End: 2024-07-17

## 2024-07-17 NOTE — TELEPHONE ENCOUNTER
Bruise on leg is continuing to get worse. Bruise has turned red and warm to the touch.   Bruise is wrapping around leg. Pain on right side of leg, bruise is on the left side. Leg is sensitive to touch. Patient was scheduled 7/17/2024 but cancelled as she felt the leg was getting better. Is there anything she should be looking for with the redness and pain?    Patient states she has not been feeling well x 1 week.     Patient is scheduled 7/19/24.

## 2024-07-20 DIAGNOSIS — E78.2 MIXED HYPERLIPIDEMIA: ICD-10-CM

## 2024-07-20 DIAGNOSIS — I10 PRIMARY HYPERTENSION: ICD-10-CM

## 2024-07-20 RX ORDER — ATORVASTATIN CALCIUM 10 MG/1
10 TABLET, FILM COATED ORAL DAILY
Qty: 90 TABLET | Refills: 0 | Status: SHIPPED | OUTPATIENT
Start: 2024-07-20

## 2024-07-23 DIAGNOSIS — I10 PRIMARY HYPERTENSION: ICD-10-CM

## 2024-07-23 RX ORDER — HYDROCHLOROTHIAZIDE 25 MG/1
25 TABLET ORAL DAILY
Qty: 90 TABLET | Refills: 0 | Status: SHIPPED | OUTPATIENT
Start: 2024-07-23

## 2024-07-24 ENCOUNTER — TELEPHONE (OUTPATIENT)
Dept: PRIMARY CARE CLINIC | Age: 69
End: 2024-07-24

## 2024-07-24 NOTE — TELEPHONE ENCOUNTER
Patient was seen in the ED and was prescribed Bactrim. Patient feels she is having an allergic reacting to the medication. She states for 27 hours she has been itching on her arms, legs and back.     She also mentioned that last night, both feet became paralyzed\" and this lasted for several minutes.  Patient has an appointment with you 7/25/24.

## 2024-07-25 ENCOUNTER — HOSPITAL ENCOUNTER (OUTPATIENT)
Dept: GENERAL RADIOLOGY | Age: 69
Discharge: HOME | End: 2024-07-25
Attending: FAMILY MEDICINE
Payer: MEDICARE

## 2024-07-25 ENCOUNTER — OFFICE VISIT (OUTPATIENT)
Dept: PRIMARY CARE CLINIC | Age: 69
End: 2024-07-25

## 2024-07-25 VITALS
DIASTOLIC BLOOD PRESSURE: 78 MMHG | BODY MASS INDEX: 48.83 KG/M2 | HEIGHT: 64 IN | HEART RATE: 85 BPM | WEIGHT: 286 LBS | SYSTOLIC BLOOD PRESSURE: 126 MMHG | TEMPERATURE: 98.6 F | RESPIRATION RATE: 16 BRPM | OXYGEN SATURATION: 95 %

## 2024-07-25 DIAGNOSIS — Z78.0 POST-MENOPAUSAL: ICD-10-CM

## 2024-07-25 DIAGNOSIS — R79.1 ABNORMAL PARTIAL THROMBOPLASTIN TIME (PTT): ICD-10-CM

## 2024-07-25 DIAGNOSIS — L03.115 CELLULITIS OF RIGHT LOWER EXTREMITY: Primary | ICD-10-CM

## 2024-07-25 DIAGNOSIS — Z23 IMMUNIZATION DUE: ICD-10-CM

## 2024-07-25 PROBLEM — R58 ECCHYMOSIS: Status: RESOLVED | Noted: 2024-07-25 | Resolved: 2024-07-25

## 2024-07-25 PROBLEM — R58 ECCHYMOSIS: Status: ACTIVE | Noted: 2024-07-25

## 2024-07-25 PROCEDURE — 77080 DXA BONE DENSITY AXIAL: CPT

## 2024-07-25 RX ORDER — CEPHALEXIN 500 MG/1
500 CAPSULE ORAL EVERY 6 HOURS
Qty: 28 CAPSULE | Refills: 0 | Status: SHIPPED | OUTPATIENT
Start: 2024-07-25 | End: 2024-08-01

## 2024-07-25 ASSESSMENT — ENCOUNTER SYMPTOMS
BLOOD IN STOOL: 0
SHORTNESS OF BREATH: 0
VOMITING: 0
COLOR CHANGE: 0
RHINORRHEA: 0
DIARRHEA: 1
NAUSEA: 0
ABDOMINAL PAIN: 0
COUGH: 0

## 2024-07-25 NOTE — ASSESSMENT & PLAN NOTE
Poorly controlled and unclear etiology, wonder if this has contributed to prolonged course and cellulitis. Will get labs as noted in orders to eval - can refer to hematology if needed. She denied hx of nosebleeds, bleeding gums or known bleeding dyscrasias.

## 2024-07-25 NOTE — PROGRESS NOTES
capsule, R-0Normal  2. Immunization due  Assessment & Plan:  Age appropriate immunization provided as per orders below.  Orders:  -     Tdap, BOOSTRIX, (age 10 yrs+), IM  3. Abnormal partial thromboplastin time (PTT)  Assessment & Plan:  Poorly controlled and unclear etiology, wonder if this has contributed to prolonged course and cellulitis. Will get labs as noted in orders to eval - can refer to hematology if needed. She denied hx of nosebleeds, bleeding gums or known bleeding dyscrasias.  Orders:  -     Mixing Study aPTT; Future  -     APTT; Future  -     Protime-INR; Future  -     CBC with Auto Differential; Future  -     Comprehensive Metabolic Panel; Future       I attest that on 07/25/24 , I spent a total of 31 minutes, in addition to face-to-face time during the visit, reviewing the patient's records and labs with the patient/guardian in the visit, counseling the patient/guardian on the above noted plan, ordering medications, ordering blood work and/or urine/stool test/office collected test sent off to the lab, and documenting the encounter after the visit.

## 2024-07-25 NOTE — ASSESSMENT & PLAN NOTE
Poorly controlled, will treat with 7-day course of Keflex (has taken in the past and tolerated) and will check in next, can extend to 14 days if needed. Area demarcated today in visit. Call back/ED precautions discussed. Reminders set to check in. She was amenable to this.

## 2024-07-26 LAB
ALBUMIN SERPL-MCNC: 4.4 G/DL (ref 3.4–5)
ALBUMIN/GLOB SERPL: 1.2 {RATIO} (ref 1.1–2.2)
ALP SERPL-CCNC: 116 U/L (ref 40–129)
ALT SERPL-CCNC: 13 U/L (ref 10–40)
ANION GAP SERPL CALCULATED.3IONS-SCNC: 13 MMOL/L (ref 3–16)
APTT BLD: 34.1 SEC (ref 22.1–36.4)
AST SERPL-CCNC: 17 U/L (ref 15–37)
BASOPHILS # BLD: 0 K/UL (ref 0–0.2)
BASOPHILS NFR BLD: 0.2 %
BILIRUB SERPL-MCNC: 0.6 MG/DL (ref 0–1)
BUN SERPL-MCNC: 16 MG/DL (ref 7–20)
CALCIUM SERPL-MCNC: 10.5 MG/DL (ref 8.3–10.6)
CHLORIDE SERPL-SCNC: 99 MMOL/L (ref 99–110)
CO2 SERPL-SCNC: 25 MMOL/L (ref 21–32)
CREAT SERPL-MCNC: 1.4 MG/DL (ref 0.6–1.2)
DEPRECATED RDW RBC AUTO: 15.5 % (ref 12.4–15.4)
EOSINOPHIL # BLD: 0.3 K/UL (ref 0–0.6)
EOSINOPHIL NFR BLD: 3.3 %
GFR SERPLBLD CREATININE-BSD FMLA CKD-EPI: 40 ML/MIN/{1.73_M2}
GLUCOSE SERPL-MCNC: 92 MG/DL (ref 70–99)
HCT VFR BLD AUTO: 44 % (ref 36–48)
HGB BLD-MCNC: 14 G/DL (ref 12–16)
INR PPP: 0.95 (ref 0.85–1.15)
LYMPHOCYTES # BLD: 1.7 K/UL (ref 1–5.1)
LYMPHOCYTES NFR BLD: 17.3 %
MCH RBC QN AUTO: 28.1 PG (ref 26–34)
MCHC RBC AUTO-ENTMCNC: 31.7 G/DL (ref 31–36)
MCV RBC AUTO: 88.6 FL (ref 80–100)
MIXING STUDIES PPP-IMP: NO
MONOCYTES # BLD: 0.5 K/UL (ref 0–1.3)
MONOCYTES NFR BLD: 5.3 %
NEUTROPHILS # BLD: 7.4 K/UL (ref 1.7–7.7)
NEUTROPHILS NFR BLD: 73.9 %
PLATELET # BLD AUTO: 275 K/UL (ref 135–450)
PMV BLD AUTO: 10.8 FL (ref 5–10.5)
POTASSIUM SERPL-SCNC: 4 MMOL/L (ref 3.5–5.1)
PROT SERPL-MCNC: 8 G/DL (ref 6.4–8.2)
PROTHROMBIN TIME: 12.9 SEC (ref 11.9–14.9)
RBC # BLD AUTO: 4.97 M/UL (ref 4–5.2)
SODIUM SERPL-SCNC: 137 MMOL/L (ref 136–145)
THROMBIN TIME: 18.2 SECS (ref 14.8–20)
WBC # BLD AUTO: 10 K/UL (ref 4–11)

## 2024-07-29 ENCOUNTER — TELEPHONE (OUTPATIENT)
Dept: PRIMARY CARE CLINIC | Age: 69
End: 2024-07-29

## 2024-07-29 DIAGNOSIS — L03.115 CELLULITIS OF RIGHT LOWER EXTREMITY: ICD-10-CM

## 2024-07-29 RX ORDER — CEPHALEXIN 500 MG/1
500 CAPSULE ORAL EVERY 6 HOURS
Qty: 28 CAPSULE | Refills: 0 | Status: SHIPPED | OUTPATIENT
Start: 2024-08-02 | End: 2024-08-09

## 2024-07-29 NOTE — TELEPHONE ENCOUNTER
Spoke with patient about this, leg is better. Call back/ED precautions discussed. Reminder set to check in at the 7 day point - can extend abx another week if needed (provisional Rx sent in just in case - advised not to fill if not needed).    ----- Message from Carmelita Vasques MD sent at 7/25/2024 10:32 AM EDT -----  Check in to be sure leg is improving - will also check in Thursday (reminder already set)

## 2024-08-01 ENCOUNTER — TELEPHONE (OUTPATIENT)
Dept: PRIMARY CARE CLINIC | Age: 69
End: 2024-08-01

## 2024-08-01 NOTE — TELEPHONE ENCOUNTER
Called and spoke with pt.  Pt sts Rt leg is doing a little better.  Pt sts her leg is not red or warm but it is just aching.  Pt sts her leg is not back to baseline yet.  Pt sts she was already informed by her pharmacy that the antibiotic was ready for her to .  Pt sts she will do another week of the antibiotic.    Pt sts she has been trying to elevate her leg as much as possible and restrict her activity and is wondering if she should still do this.      Advised pt that Dr Vasques will be out of the office until Monday, August 5, 2024 but will send a message to her.

## 2024-08-01 NOTE — TELEPHONE ENCOUNTER
Called and spoke with pt.  As per discussion with Dr Vasques, advised pt to continue elevating her leg and to continue restricting her activity and to give her leg as much rest as possible.  Advised pt to try icing her leg as per discussion with Dr Vasques.    Pt stated her leg was still very itch yesterday, 7/31/24 and is somewhat itchy today.  Pt is wondering if that is still from the allergic reaction to that medication and is it something she should be concerned about.

## 2024-08-01 NOTE — TELEPHONE ENCOUNTER
----- Message from Carmelita Vasques MD sent at 8/1/2024 11:28 AM EDT -----  Help?  ----- Message -----  From: Carmelita Vasques MD  Sent: 8/1/2024   7:00 AM EDT  To: Carmelita Vasques MD    Call to check in on leg, if not back to baseline (not counting the bump), can do another week of the same abx regimen.

## 2024-08-02 NOTE — TELEPHONE ENCOUNTER
Called and spoke with pt.  Advised pt that Dr Vasques reviewed her message and as per discussion with Dr Vasques that her itching could very possibly be from healing and the swelling going down.  Advised pt that if the itching spreads, worsens or her leg gets hot/painful, or starts to have issues breathing or tongue swelling or rash everywhere that she should seek medical attention.  As per discussion with Dr Vasques, if pt has swollen tongue or trouble breathing, to go to the ED, otherwise to an urgent care at [facility].   Pt amendable to plan.  Advised pt we will check on her next week.

## 2024-08-07 ENCOUNTER — TELEPHONE (OUTPATIENT)
Dept: VASCULAR SURGERY | Age: 69
End: 2024-08-07

## 2024-08-07 ENCOUNTER — TELEPHONE (OUTPATIENT)
Dept: PRIMARY CARE CLINIC | Age: 69
End: 2024-08-07

## 2024-08-07 DIAGNOSIS — M79.89 RIGHT LEG SWELLING: Primary | ICD-10-CM

## 2024-08-07 NOTE — TELEPHONE ENCOUNTER
Patient returned call, leg is better, especially with rest. Leg appears swollen the entire day. Vascular referral placed. Call back/ED precautions discussed. Will check in later.      ----- Message from Carmelita Vasques MD sent at 7/25/2024 10:20 AM EDT -----  Call to check in on leg, if not back to baseline (not counting the bump), can do another week of the same abx regimen.

## 2024-08-07 NOTE — TELEPHONE ENCOUNTER
Spoke with Ms. Whitehead and we got her scheduled for 8/15 for right leg swelling. [facility] called and venous duplex from 7/18 pushed into [facility] P[facility].

## 2024-08-07 NOTE — TELEPHONE ENCOUNTER
Dr. Carmelita Huson called stating she placed a referral for the patient to see the vascular surgeon. Please look into patient's chart and contact her to schedule with Dr. Dasilva    Patient # [phone]

## 2024-08-07 NOTE — TELEPHONE ENCOUNTER
Called patient about the reminder below, no answer received, message left asking for call back.      ----- Message from Carmelita Vasques MD sent at 7/25/2024 10:20 AM EDT -----  Call to check in on leg, if not back to baseline (not counting the bump), can do another week of the same abx regimen.

## 2024-08-15 ENCOUNTER — OFFICE VISIT (OUTPATIENT)
Dept: VASCULAR SURGERY | Age: 69
End: 2024-08-15
Payer: MEDICARE

## 2024-08-15 VITALS
HEART RATE: 63 BPM | BODY MASS INDEX: 49.78 KG/M2 | TEMPERATURE: 97.3 F | SYSTOLIC BLOOD PRESSURE: 137 MMHG | WEIGHT: 290 LBS | DIASTOLIC BLOOD PRESSURE: 72 MMHG | OXYGEN SATURATION: 93 %

## 2024-08-15 DIAGNOSIS — I87.301 CHRONIC VENOUS HYPERTENSION INVOLVING RIGHT SIDE: Primary | ICD-10-CM

## 2024-08-15 PROCEDURE — 99204 OFFICE O/P NEW MOD 45 MIN: CPT | Performed by: SURGERY

## 2024-08-15 PROCEDURE — G8399 PT W/DXA RESULTS DOCUMENT: HCPCS | Performed by: SURGERY

## 2024-08-15 PROCEDURE — 1123F ACP DISCUSS/DSCN MKR DOCD: CPT | Performed by: SURGERY

## 2024-08-15 PROCEDURE — G8427 DOCREV CUR MEDS BY ELIG CLIN: HCPCS | Performed by: SURGERY

## 2024-08-15 PROCEDURE — 3017F COLORECTAL CA SCREEN DOC REV: CPT | Performed by: SURGERY

## 2024-08-15 PROCEDURE — 1036F TOBACCO NON-USER: CPT | Performed by: SURGERY

## 2024-08-15 PROCEDURE — 1090F PRES/ABSN URINE INCON ASSESS: CPT | Performed by: SURGERY

## 2024-08-15 PROCEDURE — 3075F SYST BP GE 130 - 139MM HG: CPT | Performed by: SURGERY

## 2024-08-15 PROCEDURE — G8417 CALC BMI ABV UP PARAM F/U: HCPCS | Performed by: SURGERY

## 2024-08-15 PROCEDURE — 3078F DIAST BP <80 MM HG: CPT | Performed by: SURGERY

## 2024-08-15 NOTE — PROGRESS NOTES
Mental Status: She is alert and oriented to person, place, and time.      Gait: Gait normal.   Psychiatric:         Mood and Affect: Mood normal.         Behavior: Behavior normal.         Thought Content: Thought content normal.         Judgment: Judgment normal.     Vascular:  B DP/PT biphasic by doppler.        Diagnosis:     Right leg swelling and discoloration    Plan:   The right leg swelling and discoloration is consistent with either resolving hematoma versus superficial thrombophlebitis.  She has 2 nodular areas that may have been deeper varicose veins and the overlying skin response is consistent with SVT.  Either way, whether this is SVT versus resolving hematoma, the treatment would be the same.  I recommended warm compresses as needed as well as first and foremost, compression.  We discussed how compression leads to decongestion of the leg as well as resolution of both hematoma, fluid collections or SVT.    We also discussed other things that would improve the swelling in the leg, namely increased cardiovascular activity, weight management and elevation of the leg.  She is working on the weight management component and will continue to do so.    I will be happy to see her on an as-needed basis in the future.

## 2024-08-16 ENCOUNTER — TELEPHONE (OUTPATIENT)
Dept: PRIMARY CARE CLINIC | Age: 69
End: 2024-08-16

## 2024-08-16 NOTE — TELEPHONE ENCOUNTER
Spoke with her, agree no current cellulitis (s/p treatment), patient feels that she is much better now after abx. [facility] but agree with plan otherwise. Call back/ED precautions discussed.      ----- Message from SHI ACKERMAN MA sent at 8/16/2024  2:57 PM EDT -----  Pt states that she is doing well and the vascular plan is to get compression socks and minor exercise routine and was denied the Dx of cellulitis . Pt states that the vascular DrAlicia Sent over a letter explaining the pt \"situation\" .  ----- Message -----  From: Carmelita Vasques MD  Sent: 8/16/2024   2:24 PM EDT  To: Hamlet Blanchard MA    Call to check in please and see what plan vascular made/how she is.  ----- Message -----  From: Carmelita Vasques MD  Sent: 8/14/2024   7:00 AM EDT  To: Carmelita Vasques MD    Check in to ensure she saw vascular please and thank you!

## 2024-08-16 NOTE — TELEPHONE ENCOUNTER
Pt states that she is very appreciative and is doing better to some degree and has talked to her [facility] dr about her condition

## 2024-08-19 PROBLEM — I87.301 CHRONIC VENOUS HYPERTENSION INVOLVING RIGHT SIDE: Status: ACTIVE | Noted: 2024-08-19

## 2024-08-30 ENCOUNTER — OFFICE VISIT (OUTPATIENT)
Dept: CARDIOLOGY CLINIC | Age: 69
End: 2024-08-30

## 2024-08-30 VITALS
SYSTOLIC BLOOD PRESSURE: 120 MMHG | WEIGHT: 292 LBS | HEART RATE: 68 BPM | BODY MASS INDEX: 50.12 KG/M2 | DIASTOLIC BLOOD PRESSURE: 76 MMHG

## 2024-08-30 DIAGNOSIS — R73.09 ELEVATED HEMOGLOBIN A1C: ICD-10-CM

## 2024-08-30 DIAGNOSIS — Z00.00 ROUTINE CHECK-UP: ICD-10-CM

## 2024-08-30 DIAGNOSIS — R07.9 CHEST PAIN, UNSPECIFIED TYPE: Primary | ICD-10-CM

## 2024-08-30 NOTE — PROGRESS NOTES
tobacco. She reports that she does not drink alcohol and does not use drugs.     Family History:  family history includes Heart Disease in her father.     Home Medications:  Prior to Admission medications    Medication Sig Start Date End Date Taking? Authorizing Provider   hydroCHLOROthiazide (HYDRODIURIL) 25 MG tablet Take 1 tablet by mouth once daily 7/23/24  Yes Carmelita Vasques MD   atorvastatin (LIPITOR) 10 MG tablet Take 1 tablet by mouth once daily 7/20/24  Yes Carmelita Vasques MD   Azelastine HCl 137 MCG/SPRAY SOLN 2 SPRAYS BY NASAL ROUTE 2 TIMES DAILY 6/3/24  Yes Carmelita Vasques MD   cetirizine (ZYRTEC) 10 MG tablet Take 1 tablet by mouth daily 6/3/24  Yes Carmelita Vasques MD   ibuprofen (ADVIL;MOTRIN) 800 MG tablet Take 1 tablet by mouth every 8 hours as needed for Pain 11/16/23  Yes ProviderGuzman MD   ipratropium (ATROVENT) 0.03 % nasal spray  12/31/14  Yes Provider, MD Guzman           Allergies:  Bactrim [sulfamethoxazole-trimethoprim]     Review of Systems:   Constitutional: there has been no unanticipated weight loss.   Eyes: No visual changes or diplopia. No scleral icterus.  ENT: No Headaches, hearing loss or vertigo. No mouth sores or sore throat.  Cardiovascular: Reviewed in HPI   Pulmonary:  no cough or sputum production.    Gastrointestinal: No abdominal pain, appetite loss, blood in stools. No change in bowel or bladder habits.  Genitourinary: No dysuria, trouble voiding, or hematuria.  Musculoskeletal:  No gait disturbance, weakness or joint complaints.  Integumentary: No rash or pruritis.          Endocrine: No malaise, fatigue or temperature intolerance. Hematologic/Lymphatic: No abnormal bruising or bleeding, blood clots or swollen lymph nodes.  Allergic/Immunologic: No nasal congestion or hives.  All other ROS are reviewed and are unremarkable.    Physical Examination:      Wt Readings from Last 3 Encounters:   08/30/24 132.5 kg (292 lb)   08/15/24 131.5 kg (290 lb)   07/25/24 129.7  kg (286 lb)       BP Readings from Last 3 Encounters:   08/30/24 120/76   08/15/24 137/72   07/25/24 126/78       Pulse Readings from Last 3 Encounters:   08/30/24 68   08/15/24 63   07/25/24 85       Constitutional and General Appearance:  Healthy. And alert .  HEENT: eyes and ears intact. No nasal masses  THYROID: not enlarged  LUNGS:  Clear to auscultation and percussion  HEART and VASCULAR:  The apical impulses not displaced  Heart tones are crisp and normal  Cervical veins are not engorged  The carotid upstroke is normal in amplitude and contour without delay or bruit  Peripheral pulses are symmetrical and full  There is no clubbing, cyanosis of the extremities.  No peripheral edema  Femoral Arteries: 2+ and equal  Pedal Pulses: 2+ and equal   ABDOMEN::  No masses or tenderness  Liver/Spleen: No Abnormalities Noted  NEUROLOGICAL:  . Moves all extremities to command. Cranial nerves 2-12 are in tact.  PSYCHIATRIC: alert and lucid  and oriented and appropriate  SKIN: No lesions or rashes  LYMPH NODES: none enlarged            CBC with Differential:    Lab Results   Component Value Date/Time    WBC 10.0 07/25/2024 02:03 PM    RBC 4.97 07/25/2024 02:03 PM    HGB 14.0 07/25/2024 02:03 PM    HCT 44.0 07/25/2024 02:03 PM     07/25/2024 02:03 PM    MCV 88.6 07/25/2024 02:03 PM    MCH 28.1 07/25/2024 02:03 PM    MCHC 31.7 07/25/2024 02:03 PM    RDW 15.5 07/25/2024 02:03 PM    NRBC CANCELED 09/10/2013 10:20 AM    BANDSPCT 10 02/14/2024 04:43 PM    LYMPHOPCT 17.3 07/25/2024 02:03 PM    MONOPCT 5.3 07/25/2024 02:03 PM    EOSPCT 3.3 07/25/2024 02:03 PM    BASOPCT 0.2 07/25/2024 02:03 PM    MONOSABS 0.5 07/25/2024 02:03 PM    LYMPHSABS 1.7 07/25/2024 02:03 PM    EOSABS 0.3 07/25/2024 02:03 PM    BASOSABS 0.0 07/25/2024 02:03 PM    DIFFTYPE Auto-K 08/30/2012 12:19 PM     BMP:    Lab Results   Component Value Date/Time     07/25/2024 02:03 PM    K 4.0 07/25/2024 02:03 PM    CL 99 07/25/2024 02:03 PM    CO2 25

## 2024-08-31 LAB
ALBUMIN SERPL-MCNC: 4.3 G/DL (ref 3.4–5)
ANION GAP SERPL CALCULATED.3IONS-SCNC: 11 MMOL/L (ref 3–16)
BUN SERPL-MCNC: 12 MG/DL (ref 7–20)
CALCIUM SERPL-MCNC: 9.9 MG/DL (ref 8.3–10.6)
CHLORIDE SERPL-SCNC: 98 MMOL/L (ref 99–110)
CO2 SERPL-SCNC: 28 MMOL/L (ref 21–32)
CREAT SERPL-MCNC: 0.9 MG/DL (ref 0.6–1.2)
GFR SERPLBLD CREATININE-BSD FMLA CKD-EPI: 69 ML/MIN/{1.73_M2}
GLUCOSE SERPL-MCNC: 78 MG/DL (ref 70–99)
PHOSPHATE SERPL-MCNC: 3.2 MG/DL (ref 2.5–4.9)
POTASSIUM SERPL-SCNC: 3.8 MMOL/L (ref 3.5–5.1)
SODIUM SERPL-SCNC: 137 MMOL/L (ref 136–145)

## 2024-10-30 PROBLEM — Z23 NEED FOR DIPHTHERIA-TETANUS-PERTUSSIS (TDAP) VACCINE: Status: ACTIVE | Noted: 2024-10-30

## 2024-11-06 DIAGNOSIS — I10 PRIMARY HYPERTENSION: ICD-10-CM

## 2024-11-06 DIAGNOSIS — E78.2 MIXED HYPERLIPIDEMIA: ICD-10-CM

## 2024-11-06 RX ORDER — ATORVASTATIN CALCIUM 10 MG/1
10 TABLET, FILM COATED ORAL DAILY
Qty: 90 TABLET | Refills: 0 | Status: SHIPPED | OUTPATIENT
Start: 2024-11-06

## 2024-11-06 RX ORDER — HYDROCHLOROTHIAZIDE 25 MG/1
25 TABLET ORAL DAILY
Qty: 90 TABLET | Refills: 0 | Status: SHIPPED | OUTPATIENT
Start: 2024-11-06

## 2024-11-06 RX ORDER — CETIRIZINE HYDROCHLORIDE 10 MG/1
10 TABLET ORAL DAILY
Qty: 30 TABLET | Refills: 0 | Status: SHIPPED | OUTPATIENT
Start: 2024-11-06

## 2024-11-06 NOTE — TELEPHONE ENCOUNTER
[facility], he advised there is no charge for DOS 7/25/24 and bill patient received for $119 was in error. I called the patient and let her know they will mail her and updated statement.

## 2024-11-06 NOTE — TELEPHONE ENCOUNTER
Medication:   Requested Prescriptions     Pending Prescriptions Disp Refills    hydroCHLOROthiazide (HYDRODIURIL) 25 MG tablet [Pharmacy Med Name: hydroCHLOROthiazide 25 MG Oral Tablet] 90 tablet 0     Sig: Take 1 tablet by mouth once daily    atorvastatin (LIPITOR) 10 MG tablet [Pharmacy Med Name: Atorvastatin Calcium 10 MG Oral Tablet] 90 tablet 0     Sig: Take 1 tablet by mouth once daily    cetirizine (ZYRTEC) 10 MG tablet [Pharmacy Med Name: Cetirizine HCl 10 MG Oral Tablet] 30 tablet 0     Sig: Take 1 tablet by mouth once daily        Last Filled:      Patient Phone Number: [phone] (home) [phone] (work)    Last appt: 7/25/2024   Next appt: 12/30/2024    Last OARRS:        No data to display

## 2024-12-18 RX ORDER — CETIRIZINE HYDROCHLORIDE 10 MG/1
10 TABLET ORAL DAILY
Qty: 30 TABLET | Refills: 0 | Status: SHIPPED | OUTPATIENT
Start: 2024-12-18

## 2024-12-30 ENCOUNTER — OFFICE VISIT (OUTPATIENT)
Dept: PRIMARY CARE CLINIC | Age: 69
End: 2024-12-30

## 2024-12-30 VITALS
DIASTOLIC BLOOD PRESSURE: 76 MMHG | WEIGHT: 293 LBS | HEIGHT: 64 IN | BODY MASS INDEX: 50.02 KG/M2 | OXYGEN SATURATION: 95 % | HEART RATE: 78 BPM | SYSTOLIC BLOOD PRESSURE: 130 MMHG | RESPIRATION RATE: 15 BRPM | TEMPERATURE: 97.6 F

## 2024-12-30 DIAGNOSIS — R20.0 HAND NUMBNESS: ICD-10-CM

## 2024-12-30 DIAGNOSIS — Z12.31 ENCOUNTER FOR SCREENING MAMMOGRAM FOR MALIGNANT NEOPLASM OF BREAST: ICD-10-CM

## 2024-12-30 DIAGNOSIS — J30.9 ALLERGIC RHINITIS, UNSPECIFIED SEASONALITY, UNSPECIFIED TRIGGER: ICD-10-CM

## 2024-12-30 DIAGNOSIS — E78.5 HYPERLIPIDEMIA, UNSPECIFIED HYPERLIPIDEMIA TYPE: ICD-10-CM

## 2024-12-30 DIAGNOSIS — Z00.00 HEALTHCARE MAINTENANCE: Primary | ICD-10-CM

## 2024-12-30 RX ORDER — CETIRIZINE HYDROCHLORIDE 10 MG/1
10 TABLET ORAL DAILY
Qty: 90 TABLET | Refills: 1 | Status: SHIPPED | OUTPATIENT
Start: 2024-12-30

## 2024-12-30 SDOH — ECONOMIC STABILITY: FOOD INSECURITY: WITHIN THE PAST 12 MONTHS, THE FOOD YOU BOUGHT JUST DIDN'T LAST AND YOU DIDN'T HAVE MONEY TO GET MORE.: NEVER TRUE

## 2024-12-30 SDOH — ECONOMIC STABILITY: FOOD INSECURITY: WITHIN THE PAST 12 MONTHS, YOU WORRIED THAT YOUR FOOD WOULD RUN OUT BEFORE YOU GOT MONEY TO BUY MORE.: NEVER TRUE

## 2024-12-30 SDOH — ECONOMIC STABILITY: INCOME INSECURITY: HOW HARD IS IT FOR YOU TO PAY FOR THE VERY BASICS LIKE FOOD, HOUSING, MEDICAL CARE, AND HEATING?: NOT HARD AT ALL

## 2024-12-30 ASSESSMENT — ENCOUNTER SYMPTOMS
DIARRHEA: 0
ABDOMINAL PAIN: 0
COUGH: 0
COLOR CHANGE: 0
VOMITING: 0
NAUSEA: 0
SHORTNESS OF BREATH: 0
BLOOD IN STOOL: 0
RHINORRHEA: 0

## 2024-12-30 ASSESSMENT — LIFESTYLE VARIABLES
HOW OFTEN DO YOU HAVE A DRINK CONTAINING ALCOHOL: NEVER
HOW MANY STANDARD DRINKS CONTAINING ALCOHOL DO YOU HAVE ON A TYPICAL DAY: PATIENT DOES NOT DRINK

## 2024-12-30 ASSESSMENT — PATIENT HEALTH QUESTIONNAIRE - PHQ9
SUM OF ALL RESPONSES TO PHQ9 QUESTIONS 1 & 2: 0
SUM OF ALL RESPONSES TO PHQ QUESTIONS 1-9: 0
2. FEELING DOWN, DEPRESSED OR HOPELESS: NOT AT ALL
SUM OF ALL RESPONSES TO PHQ QUESTIONS 1-9: 0
1. LITTLE INTEREST OR PLEASURE IN DOING THINGS: NOT AT ALL
SUM OF ALL RESPONSES TO PHQ QUESTIONS 1-9: 0
SUM OF ALL RESPONSES TO PHQ QUESTIONS 1-9: 0

## 2024-12-30 NOTE — ASSESSMENT & PLAN NOTE
Poorly controlled. Intermittent. Denied neck pain and normal exam today. Will check labs to eval for etiologies. If worsening/persistent, should do neck imaging and/or additional evaluation (carpal tunnel for example). Hydration encouraged. She was amenable to this plan.

## 2024-12-30 NOTE — ASSESSMENT & PLAN NOTE
Overall doing okay. Reminded to schedule dental exam and colonoscopy when able. Age appropriate screening provided as per orders below. Counseled briefly and provided written materials on recommendations for exercise and healthy diet. Other problems addressed today as otherwise noted.

## 2024-12-30 NOTE — PROGRESS NOTES
Kady Hope is a 69 y.o. year old female here for:    Chief Complaint:    Chief Complaint   Patient presents with    Annual Exam     Patient's Sex: female Medicare Member ID: [id number] - (Medicare)    Ethnicity:    Non- / Non   Race:   Black /     Provider Name:  Carmelita Vasques M.D.  [facility]  [address] [place] [address]  Dept: [phone]  Dept Fax: [phone]    Provider ID Type: NPI  Billing Provider ID:  [id number]    Patient Care Team:  Carmelita Vasques MD as PCP - General (Family Medicine)  Carmelita Vasques MD as PCP - Empaneled Provider    Current concerns:    Medical History:  Past Medical History:   Diagnosis Date    Allergic rhinitis     Chronic back pain 03/23/2016    Hidradenitis suppurativa     Hyperlipidemia     Hypertension     Leukocytosis     Obesity     ISABELA (obstructive sleep apnea)     uses CPAP nightly    Spondylosis of lumbar region without myelopathy or radiculopathy 03/23/2016       Patient Active Problem List   Diagnosis    Hyperlipidemia    Chronic back pain    ISABELA (obstructive sleep apnea)    Spondylosis of lumbar region without myelopathy or radiculopathy    Primary hypertension    Immunization due    Post-menopausal    Leukocytosis    Chronic seasonal allergic rhinitis    Healthcare maintenance    Other fatigue    Elevated hemoglobin A1c    Cellulitis of right lower extremity    Abnormal partial thromboplastin time (PTT)    Chronic venous hypertension involving right side    Body mass index (BMI) 45.0-49.9, adult    Need for diphtheria-tetanus-pertussis (Tdap) vaccine    Allergic rhinitis    Hand numbness    Encounter for screening mammogram for malignant neoplasm of breast       Social History:  Social History     Socioeconomic History    Marital status:      Spouse name: None    Number of children: None    Years of education: None    Highest education level: None   Tobacco Use    Smoking status:

## 2024-12-30 NOTE — PATIENT INSTRUCTIONS
If you drink soda, consider a soda that has Stevia as the sweetener (and in fact, Stevia if a sweetener is needed to be added to food/drink). Examples of these sodas are Zevia or Olipop.     Reminder: Please call to schedule dental exam and colonoscopy when able.

## 2025-01-03 DIAGNOSIS — R20.0 HAND NUMBNESS: ICD-10-CM

## 2025-01-03 DIAGNOSIS — E78.5 HYPERLIPIDEMIA, UNSPECIFIED HYPERLIPIDEMIA TYPE: ICD-10-CM

## 2025-01-04 LAB
ANION GAP SERPL CALCULATED.3IONS-SCNC: 12 MMOL/L (ref 3–16)
BUN SERPL-MCNC: 14 MG/DL (ref 7–20)
CALCIUM SERPL-MCNC: 10.3 MG/DL (ref 8.3–10.6)
CHLORIDE SERPL-SCNC: 103 MMOL/L (ref 99–110)
CHOLEST SERPL-MCNC: 183 MG/DL (ref 0–199)
CO2 SERPL-SCNC: 26 MMOL/L (ref 21–32)
CREAT SERPL-MCNC: 0.9 MG/DL (ref 0.6–1.2)
GFR SERPLBLD CREATININE-BSD FMLA CKD-EPI: 69 ML/MIN/{1.73_M2}
GLUCOSE SERPL-MCNC: 89 MG/DL (ref 70–99)
HDLC SERPL-MCNC: 65 MG/DL (ref 40–60)
LDLC SERPL CALC-MCNC: 96 MG/DL
MAGNESIUM SERPL-MCNC: 1.81 MG/DL (ref 1.8–2.4)
PHOSPHATE SERPL-MCNC: 3.4 MG/DL (ref 2.5–4.9)
POTASSIUM SERPL-SCNC: 3.8 MMOL/L (ref 3.5–5.1)
SODIUM SERPL-SCNC: 141 MMOL/L (ref 136–145)
TRIGL SERPL-MCNC: 110 MG/DL (ref 0–150)
VLDLC SERPL CALC-MCNC: 22 MG/DL

## 2025-01-08 ENCOUNTER — HOSPITAL ENCOUNTER (OUTPATIENT)
Dept: MAMMOGRAPHY | Age: 70
Discharge: HOME | End: 2025-01-08
Attending: FAMILY MEDICINE
Payer: MEDICARE

## 2025-01-08 VITALS — WEIGHT: 288 LBS | BODY MASS INDEX: 49.17 KG/M2 | HEIGHT: 64 IN

## 2025-01-08 DIAGNOSIS — Z12.31 ENCOUNTER FOR SCREENING MAMMOGRAM FOR MALIGNANT NEOPLASM OF BREAST: ICD-10-CM

## 2025-01-08 PROCEDURE — 77067 SCR MAMMO BI INCL CAD: CPT

## 2025-01-29 PROBLEM — Z12.31 ENCOUNTER FOR SCREENING MAMMOGRAM FOR MALIGNANT NEOPLASM OF BREAST: Status: RESOLVED | Noted: 2024-12-30 | Resolved: 2025-01-29

## 2025-01-29 PROBLEM — Z00.00 HEALTHCARE MAINTENANCE: Status: RESOLVED | Noted: 2023-12-28 | Resolved: 2025-01-29

## 2025-02-25 DIAGNOSIS — I10 PRIMARY HYPERTENSION: ICD-10-CM

## 2025-02-25 DIAGNOSIS — E78.2 MIXED HYPERLIPIDEMIA: ICD-10-CM

## 2025-02-25 RX ORDER — ATORVASTATIN CALCIUM 10 MG/1
10 TABLET, FILM COATED ORAL DAILY
Qty: 90 TABLET | Refills: 0 | Status: SHIPPED | OUTPATIENT
Start: 2025-02-25

## 2025-02-25 RX ORDER — HYDROCHLOROTHIAZIDE 25 MG/1
25 TABLET ORAL DAILY
Qty: 90 TABLET | Refills: 0 | Status: SHIPPED | OUTPATIENT
Start: 2025-02-25

## 2025-05-16 ENCOUNTER — OFFICE VISIT (OUTPATIENT)
Dept: PRIMARY CARE CLINIC | Age: 70
End: 2025-05-16

## 2025-05-16 VITALS
OXYGEN SATURATION: 96 % | TEMPERATURE: 98 F | WEIGHT: 293 LBS | SYSTOLIC BLOOD PRESSURE: 128 MMHG | HEIGHT: 64 IN | RESPIRATION RATE: 17 BRPM | BODY MASS INDEX: 50.02 KG/M2 | HEART RATE: 77 BPM | DIASTOLIC BLOOD PRESSURE: 78 MMHG

## 2025-05-16 DIAGNOSIS — R53.81 MALAISE: Primary | ICD-10-CM

## 2025-05-16 DIAGNOSIS — D72.829 LEUKOCYTOSIS, UNSPECIFIED TYPE: ICD-10-CM

## 2025-05-16 DIAGNOSIS — R22.42 LUMP OF SKIN OF LEFT LOWER EXTREMITY: ICD-10-CM

## 2025-05-16 DIAGNOSIS — R73.09 ABNORMAL GLUCOSE: ICD-10-CM

## 2025-05-16 PROBLEM — R79.1 ABNORMAL PARTIAL THROMBOPLASTIN TIME (PTT): Status: RESOLVED | Noted: 2024-07-25 | Resolved: 2025-05-16

## 2025-05-16 PROBLEM — L03.115 CELLULITIS OF RIGHT LOWER EXTREMITY: Status: RESOLVED | Noted: 2024-07-25 | Resolved: 2025-05-16

## 2025-05-16 SDOH — ECONOMIC STABILITY: FOOD INSECURITY: WITHIN THE PAST 12 MONTHS, YOU WORRIED THAT YOUR FOOD WOULD RUN OUT BEFORE YOU GOT MONEY TO BUY MORE.: NEVER TRUE

## 2025-05-16 SDOH — ECONOMIC STABILITY: INCOME INSECURITY: IN THE LAST 12 MONTHS, WAS THERE A TIME WHEN YOU WERE NOT ABLE TO PAY THE MORTGAGE OR RENT ON TIME?: NO

## 2025-05-16 SDOH — ECONOMIC STABILITY: FOOD INSECURITY: WITHIN THE PAST 12 MONTHS, THE FOOD YOU BOUGHT JUST DIDN'T LAST AND YOU DIDN'T HAVE MONEY TO GET MORE.: NEVER TRUE

## 2025-05-16 SDOH — ECONOMIC STABILITY: TRANSPORTATION INSECURITY
IN THE PAST 12 MONTHS, HAS THE LACK OF TRANSPORTATION KEPT YOU FROM MEDICAL APPOINTMENTS OR FROM GETTING MEDICATIONS?: NO

## 2025-05-16 SDOH — ECONOMIC STABILITY: TRANSPORTATION INSECURITY
IN THE PAST 12 MONTHS, HAS LACK OF TRANSPORTATION KEPT YOU FROM MEETINGS, WORK, OR FROM GETTING THINGS NEEDED FOR DAILY LIVING?: NO

## 2025-05-16 ASSESSMENT — PATIENT HEALTH QUESTIONNAIRE - PHQ9
1. LITTLE INTEREST OR PLEASURE IN DOING THINGS: NOT AT ALL
1. LITTLE INTEREST OR PLEASURE IN DOING THINGS: NOT AT ALL
SUM OF ALL RESPONSES TO PHQ9 QUESTIONS 1 & 2: 0
SUM OF ALL RESPONSES TO PHQ QUESTIONS 1-9: 0
2. FEELING DOWN, DEPRESSED OR HOPELESS: NOT AT ALL
2. FEELING DOWN, DEPRESSED OR HOPELESS: NOT AT ALL

## 2025-05-16 ASSESSMENT — ENCOUNTER SYMPTOMS
RHINORRHEA: 0
BLOOD IN STOOL: 0
COLOR CHANGE: 0
ABDOMINAL PAIN: 0
VOMITING: 0
NAUSEA: 0
COUGH: 0
SHORTNESS OF BREATH: 0
DIARRHEA: 0

## 2025-05-16 NOTE — ASSESSMENT & PLAN NOTE
Unclear etiology. ?Lipoma. We discussed likely benign but hard to Dx without pathology. Imaging can occasionally help to reassure. Will US to eval.

## 2025-05-16 NOTE — PATIENT INSTRUCTIONS
To schedule an for your ultrasound appointment at any of our five imaging locations below, please call [phone]:    1) [facility]  [address] [address]    2) [facility]  [address] [place] [address]    3) [facility]  [address]  [place] [address]    4) [facility]  [address]  [place] [address]    5) [facility]  [address]  [place] [address]    Please find our [facility] Lab Location Guide below for your convenience!    CENTRAL LOCATIONS    1) [facility]  [address]  [place], [place] [address]  Phone: [phone]    2) [facility]  [address] [place], [place] [address]  Phone: [phone]    [facility] LOCATIONS    3) [facility]  [address]  [place], [place] [address]  Phone: [phone]    4) [facility]  [address] [place] [address]  Phone: [phone]    5) [facility]  [address] [place] [address]  Phone: [phone]    6) [facility]  [address]  [place] [address]   Phone: [phone]    [place] LOCATIONS    7) [facility]  [address]  [place] [address]  Phone: [phone]    8) [facility]  [address] [place] [address]  Phone: [phone]    9) [facility]  [address] [place] [address]  Phone: [phone]    10) [facility]  [address]  [place] [address]  Phone: [phone]    [place] LOCATIONS    11) [facility]  [address]  [place] [address]  Phone: [phone]    12) [facility]  [address]  [place] [address]  Phone: [phone] central

## 2025-05-16 NOTE — ASSESSMENT & PLAN NOTE
Poorly controlled, unclear etiology. Improving. She will check in with her cardiologist. I will check labs to eval as noted in orders below. Extensive discussion today about stress reduction today.

## 2025-05-22 DIAGNOSIS — R53.81 MALAISE: ICD-10-CM

## 2025-05-22 DIAGNOSIS — D72.829 LEUKOCYTOSIS, UNSPECIFIED TYPE: ICD-10-CM

## 2025-05-22 DIAGNOSIS — R73.09 ABNORMAL GLUCOSE: ICD-10-CM

## 2025-05-23 ENCOUNTER — RESULTS FOLLOW-UP (OUTPATIENT)
Dept: PRIMARY CARE CLINIC | Age: 70
End: 2025-05-23

## 2025-05-23 LAB
ALBUMIN SERPL-MCNC: 4.2 G/DL (ref 3.4–5)
ALBUMIN/GLOB SERPL: 1.1 {RATIO} (ref 1.1–2.2)
ALP SERPL-CCNC: 102 U/L (ref 40–129)
ALT SERPL-CCNC: 21 U/L (ref 10–40)
ANION GAP SERPL CALCULATED.3IONS-SCNC: 13 MMOL/L (ref 3–16)
AST SERPL-CCNC: 18 U/L (ref 15–37)
BASOPHILS # BLD: 0.1 K/UL (ref 0–0.2)
BASOPHILS NFR BLD: 1.2 %
BILIRUB SERPL-MCNC: 0.4 MG/DL (ref 0–1)
BUN SERPL-MCNC: 14 MG/DL (ref 7–20)
CALCIUM SERPL-MCNC: 9.9 MG/DL (ref 8.3–10.6)
CHLORIDE SERPL-SCNC: 100 MMOL/L (ref 99–110)
CO2 SERPL-SCNC: 25 MMOL/L (ref 21–32)
CREAT SERPL-MCNC: 1 MG/DL (ref 0.6–1.2)
DEPRECATED RDW RBC AUTO: 14.3 % (ref 12.4–15.4)
EOSINOPHIL # BLD: 0.2 K/UL (ref 0–0.6)
EOSINOPHIL NFR BLD: 1.4 %
EST. AVERAGE GLUCOSE BLD GHB EST-MCNC: 114 MG/DL
GFR SERPLBLD CREATININE-BSD FMLA CKD-EPI: 60 ML/MIN/{1.73_M2}
GLUCOSE SERPL-MCNC: 95 MG/DL (ref 70–99)
HBA1C MFR BLD: 5.6 %
HCT VFR BLD AUTO: 42.6 % (ref 36–48)
HGB BLD-MCNC: 13.8 G/DL (ref 12–16)
LYMPHOCYTES # BLD: 2.2 K/UL (ref 1–5.1)
LYMPHOCYTES NFR BLD: 20.5 %
MCH RBC QN AUTO: 28.2 PG (ref 26–34)
MCHC RBC AUTO-ENTMCNC: 32.4 G/DL (ref 31–36)
MCV RBC AUTO: 87.1 FL (ref 80–100)
MONOCYTES # BLD: 0.7 K/UL (ref 0–1.3)
MONOCYTES NFR BLD: 6.8 %
NEUTROPHILS # BLD: 7.6 K/UL (ref 1.7–7.7)
NEUTROPHILS NFR BLD: 70.1 %
PLATELET # BLD AUTO: 273 K/UL (ref 135–450)
PMV BLD AUTO: 10.7 FL (ref 5–10.5)
POTASSIUM SERPL-SCNC: 3.9 MMOL/L (ref 3.5–5.1)
PROT SERPL-MCNC: 7.9 G/DL (ref 6.4–8.2)
RBC # BLD AUTO: 4.9 M/UL (ref 4–5.2)
SODIUM SERPL-SCNC: 138 MMOL/L (ref 136–145)
TSH SERPL DL<=0.005 MIU/L-ACNC: 1.18 UIU/ML (ref 0.27–4.2)
WBC # BLD AUTO: 10.8 K/UL (ref 4–11)

## 2025-05-29 ENCOUNTER — TELEPHONE (OUTPATIENT)
Dept: PRIMARY CARE CLINIC | Age: 70
End: 2025-05-29

## 2025-05-29 NOTE — TELEPHONE ENCOUNTER
Spoke with the patient who advised for the past 3 days she has noted blood when using the bathroom. Bright red. Pain in her back but no pain with BM or dysuria. Reports she is unsure if this is coming from her vagina, bladder or rectum. Scheduled tomorrow in office. If sx or pain worsen in the interim she will go to the ED. She was amenable to this. Strict call back/ED precautions discussed.

## 2025-05-30 ENCOUNTER — OFFICE VISIT (OUTPATIENT)
Dept: PRIMARY CARE CLINIC | Age: 70
End: 2025-05-30

## 2025-05-30 VITALS
TEMPERATURE: 98.1 F | OXYGEN SATURATION: 97 % | BODY MASS INDEX: 50.02 KG/M2 | DIASTOLIC BLOOD PRESSURE: 74 MMHG | SYSTOLIC BLOOD PRESSURE: 136 MMHG | WEIGHT: 293 LBS | HEIGHT: 64 IN | HEART RATE: 95 BPM | RESPIRATION RATE: 16 BRPM

## 2025-05-30 DIAGNOSIS — R58 BLOOD IN TOILET BOWL: ICD-10-CM

## 2025-05-30 DIAGNOSIS — R79.89 ABNORMAL CBC: ICD-10-CM

## 2025-05-30 DIAGNOSIS — R58 BLOOD IN TOILET BOWL: Primary | ICD-10-CM

## 2025-05-30 LAB
BACTERIA URNS QL MICRO: ABNORMAL /HPF
BILIRUB UR QL STRIP.AUTO: NEGATIVE
CLARITY UR: CLEAR
COLOR UR: YELLOW
EPI CELLS #/AREA URNS AUTO: 14 /HPF (ref 0–5)
FERRITIN SERPL IA-MCNC: 160 NG/ML (ref 15–150)
GLUCOSE UR STRIP.AUTO-MCNC: NEGATIVE MG/DL
HGB UR QL STRIP.AUTO: ABNORMAL
HYALINE CASTS #/AREA URNS AUTO: 0 /LPF (ref 0–8)
IRON SATN MFR SERPL: 18 % (ref 15–50)
IRON SERPL-MCNC: 51 UG/DL (ref 37–145)
KETONES UR STRIP.AUTO-MCNC: NEGATIVE MG/DL
LEUKOCYTE ESTERASE UR QL STRIP.AUTO: NEGATIVE
NITRITE UR QL STRIP.AUTO: NEGATIVE
PH UR STRIP.AUTO: 5.5 [PH] (ref 5–8)
PROT UR STRIP.AUTO-MCNC: ABNORMAL MG/DL
RBC CLUMPS #/AREA URNS AUTO: 5 /HPF (ref 0–4)
SP GR UR STRIP.AUTO: 1.02 (ref 1–1.03)
TIBC SERPL-MCNC: 276 UG/DL (ref 260–445)
UA DIPSTICK W REFLEX MICRO PNL UR: YES
URN SPEC COLLECT METH UR: ABNORMAL
UROBILINOGEN UR STRIP-ACNC: 1 E.U./DL
WBC #/AREA URNS AUTO: 4 /HPF (ref 0–5)

## 2025-05-30 ASSESSMENT — ENCOUNTER SYMPTOMS
VOMITING: 0
ABDOMINAL PAIN: 0
COLOR CHANGE: 0
SHORTNESS OF BREATH: 0
DIARRHEA: 0
NAUSEA: 0
COUGH: 0
BACK PAIN: 1
BLOOD IN STOOL: 0

## 2025-05-30 NOTE — PROGRESS NOTES
Kady Hope is a 69 y.o. year old female here for:    Chief Complaint:    Chief Complaint   Patient presents with    Bleeding with Bathroom Use     Subjective:    Today, her current concerns include:    HPI:  #Bleeding with Bathroom Use  - Onset: 1 month ago  - Context: Never in the toilet only when wiping, except 1 month ago had a blood clot that dropped in the bathroom, not sure where it came from. Happens with urination more than bowel movements but sometimes the latter. Has a colonoscopy in 2 weeks or so. Recent CMP unremarkable as well as CBC other than elevated MPV.  - Quality: Painless  - Severity: No pain but did have back pain with this. Back pain was 8/10 at it worst, currently 2/10  - Timing/Duration: Has happened twice so far, most recently about 10 days ago or so  - Inciting Event: Unknown - denied trauma or injury  - Progression:  Has recurred  - Modifiers: Nothing known makes this better or worse  - Associated Symptoms: Per ROS as otherwise stated in this note  - Previous occurrence: Never prior    Past Medical History:   Diagnosis Date    Abnormal partial thromboplastin time (PTT) 07/25/2024    Allergic rhinitis     Cellulitis of right lower extremity 07/25/2024    Chronic back pain 03/23/2016    Elevated hemoglobin A1c 04/08/2024    Hidradenitis suppurativa     Hyperlipidemia     Hypertension     Leukocytosis     Obesity     ISABELA (obstructive sleep apnea)     uses CPAP nightly    Spondylosis of lumbar region without myelopathy or radiculopathy 03/23/2016     Social History     Tobacco Use    Smoking status: Never    Smokeless tobacco: Never   Vaping Use    Vaping status: Never Used   Substance Use Topics    Alcohol use: No    Drug use: No     Family History   Problem Relation Age of Onset    Heart Disease Father     Breast Cancer Neg Hx     Colon Cancer Neg Hx      Past Surgical History:   Procedure Laterality Date    ANKLE SURGERY      right-broken bones    BREAST BIOPSY      benign

## 2025-05-30 NOTE — PATIENT INSTRUCTIONS
To schedule an appointment for your ultrasound at any of our five imaging locations below, please call [phone]:    1) [facility]  [address] [place] [address]    2) [facility]  [address] [place] [address]    3) [facility]  [address]  [place] [address]    4) [facility]  [address]  [place] [address]    5) [facility]  [address]  [place] [address]       Please find our [facility] Lab Location Guide below for your convenience!    CENTRAL LOCATIONS    1) [facility]  [address]  [place], [place] [address]  Phone: [phone]    2) [facility]  [address] [place], [place] [address]  Phone: [phone]    [facility] LOCATIONS    3) [facility]  [address]  [place], [place] [address]  Phone: [phone]    4) [facility]  [address] [place] [address]  Phone: [phone]    5) [facility]  [address] [place] [address]  Phone: [phone]    6) [facility]  [address]  [place] [address]   Phone: [phone]    [place] LOCATIONS    7) [facility]  [address]  [place] [address]  Phone: [phone]    8) [facility]  [address] [place] [address]  Phone: [phone]    9) [facility]  [address] [place] [address]  Phone: [phone]    10) [facility]  [address]  [place] [address]  Phone: [phone]    [place] LOCATIONS    11) [facility]  [address]  [place] [address]  Phone: [phone]    12) [facility]  [address]  [place] [address]  Phone:

## 2025-05-30 NOTE — ASSESSMENT & PLAN NOTE
Poorly controlled, unclear source of bleeding, etiology and prognosis. New undiagnosed problem needing work-up. Asymptomatic other than back pain but no CVAT on exam today bilaterally. Will check UA (if + for blood will send to urology) as well as TVUS (which if + can send to GYN or uroGYN). Will also check Fe studies today as well as keep an eye for her colonoscopy results (note to the physician doing as FYI) to eval for hemorrhoids. She was amenable to this plan. Call back/ED precautions discussed.

## 2025-06-02 ENCOUNTER — HOSPITAL ENCOUNTER (OUTPATIENT)
Dept: ULTRASOUND IMAGING | Age: 70
Discharge: HOME | End: 2025-06-02
Payer: MEDICARE

## 2025-06-02 ENCOUNTER — RESULTS FOLLOW-UP (OUTPATIENT)
Dept: PRIMARY CARE CLINIC | Age: 70
End: 2025-06-02

## 2025-06-02 DIAGNOSIS — R22.42 LUMP OF SKIN OF LEFT LOWER EXTREMITY: ICD-10-CM

## 2025-06-02 DIAGNOSIS — R58 BLOOD IN TOILET BOWL: ICD-10-CM

## 2025-06-02 PROCEDURE — 76999 ECHO EXAMINATION PROCEDURE: CPT

## 2025-06-02 PROCEDURE — 76830 TRANSVAGINAL US NON-OB: CPT

## 2025-06-02 PROCEDURE — 76856 US EXAM PELVIC COMPLETE: CPT

## 2025-06-03 ENCOUNTER — RESULTS FOLLOW-UP (OUTPATIENT)
Dept: PRIMARY CARE CLINIC | Age: 70
End: 2025-06-03

## 2025-06-03 DIAGNOSIS — R31.0 GROSS HEMATURIA: Primary | ICD-10-CM

## 2025-06-04 ENCOUNTER — TELEPHONE (OUTPATIENT)
Dept: PRIMARY CARE CLINIC | Age: 70
End: 2025-06-04

## 2025-06-04 DIAGNOSIS — R58 BLOOD IN TOILET BOWL: Primary | ICD-10-CM

## 2025-06-04 NOTE — TELEPHONE ENCOUNTER
Pt called stating she does not know what to do.  Pt sts the urogyn she was referred to sts they do not treat what pt is being referred to.  Pt sts she was told by the urogyn office to call the urology group.  Pt sts when she called the urology group, she was advised that they may only treat half of what the referral is for.  Pt would like to speak with Dr Vasques on what she should do.

## 2025-06-06 DIAGNOSIS — R58 BLOOD IN TOILET BOWL: Primary | ICD-10-CM

## 2025-06-06 NOTE — TELEPHONE ENCOUNTER
Pt called stating that Dr Anderson is accepting new pts but is booked out to the end of June.  Pt sts she does not want to wait that long.  Pt sts she can get in sooner with Dr Eastman.  Pt  is requesting a referral to be placed for Dr Torres.

## 2025-06-06 NOTE — TELEPHONE ENCOUNTER
Pt called stating she attempted to schedule with Dr Lee and was advised that she is no longer accepting any new patients.  Pt sts she used to see Dr Anderson \"a long\" time ago and will call the office to see if she would be able to schedule with her.  Pt sts she will let us know.  Pt sts if Dr Anderson is unable to see her, she would not mind a referral to ANY OBGYN that Dr Vasques would recommend, male or female.

## 2025-06-12 ENCOUNTER — TELEPHONE (OUTPATIENT)
Dept: PRIMARY CARE CLINIC | Age: 70
End: 2025-06-12

## 2025-06-12 NOTE — TELEPHONE ENCOUNTER
Pt called stating she wanted to update Dr Vasques about urology appt status.  Pt sts she still hasn't been able to get in with a provider.  Pt sts she was advised that Dr Basilio is leaving the practice at the end of the month and she was unable to schedule with them.  PT sts the other one that was mentioned, advised pt that they do not treat her issues and she was not able to schedule an appt.      Pt is seeking advice from Dr Vasques on what to do.    Pt sts she does have an appt to see Dr Lee on 6/17/25.

## 2025-06-17 ENCOUNTER — OFFICE VISIT (OUTPATIENT)
Dept: GYNECOLOGY | Age: 70
End: 2025-06-17

## 2025-06-17 VITALS
BODY MASS INDEX: 53.38 KG/M2 | HEART RATE: 90 BPM | DIASTOLIC BLOOD PRESSURE: 70 MMHG | WEIGHT: 293 LBS | OXYGEN SATURATION: 98 % | SYSTOLIC BLOOD PRESSURE: 140 MMHG

## 2025-06-17 DIAGNOSIS — N95.0 PMB (POSTMENOPAUSAL BLEEDING): Primary | ICD-10-CM

## 2025-06-17 DIAGNOSIS — N84.1 CERVICAL POLYP: ICD-10-CM

## 2025-06-17 NOTE — PROGRESS NOTES
The sensitive parts of the examination were performed with Lev Reveles MA as a chaperone.  Lev was present during the entirety of the sensitive parts of the examination.

## 2025-06-17 NOTE — PROGRESS NOTES
Kady Hope is a 69 y.o. female who is here today for evaluation of abnormal uterine bleeding.    Kady Hope has been experiencing postmenopausal bleeding.  She denies any CVA tenderness or changes in voiding issues.  She denies CVA pain.  She denies any changes in lower extremity edema.  She denies any unintended weight loss.  The patient reports postmenopausal bleeding episode on April 25 and May 20.  She also reports ongoing bleeding through the end of May.    As part of her evaluation pelvic ultrasound was performed:  EXAM: ULTRASOUND PELVIS  INDICATION: Hemorrhage, not elsewhere classified  COMPARISON: None  TECHNIQUE: Transabdominal, transvaginal  FINDINGS:   UTERUS:  *  Size: 10.2 x 3.5 x 6.6 cm  *  Orientation: Anteverted.  *  Masses: Posterior mildly heterogeneous hypoechoic mass identified measuring  3.7 x 3.3 x 3.8 cm.  *  Cervix: Normal.  *  Endometrium: 4 mm thickness.  FREE FLUID: None.  RIGHT OVARY: Not well visualized secondary to shadowing from intestinal gas.  LEFT OVARY: Not well visualized secondary to shadowing from intestinal gas.  DUPLEX: No.  OTHER: None.  IMPRESSION:  1. Posterior uterine leiomyoma as described.  2. Poor visualization of ovaries secondary to intestinal gas blocking.    Discussed finding of uterine fibroid.  I suspect this is not of clinical significance or contributing factor.  We discussed evaluation of endometrial thickness noting that her endometrial stripe was 4 mm.  We discussed the limitations of evaluating for endometrial stripe and -American women for reassurance regarding endometrial pathology noting its limitations.  Based on this, we were recommending further evaluation with endometrial sampling.  Rationale was explained to the patient.  All questions were answered.  She wished to proceed.    Review of systems:  No complaints of symptoms involving:  Constitutional: negative for fever, chills.  Eyes: No change in vision, double

## 2025-06-19 ENCOUNTER — ANESTHESIA (OUTPATIENT)
Dept: ENDOSCOPY | Age: 70
End: 2025-06-19
Payer: MEDICARE

## 2025-06-19 ENCOUNTER — APPOINTMENT (OUTPATIENT)
Dept: ENDOSCOPY | Age: 70
End: 2025-06-19
Attending: INTERNAL MEDICINE
Payer: MEDICARE

## 2025-06-19 ENCOUNTER — HOSPITAL ENCOUNTER (OUTPATIENT)
Age: 70
Setting detail: OUTPATIENT SURGERY
Discharge: HOME | End: 2025-06-19
Attending: INTERNAL MEDICINE | Admitting: INTERNAL MEDICINE
Payer: MEDICARE

## 2025-06-19 ENCOUNTER — ANESTHESIA EVENT (OUTPATIENT)
Dept: ENDOSCOPY | Age: 70
End: 2025-06-19
Payer: MEDICARE

## 2025-06-19 ENCOUNTER — RESULTS FOLLOW-UP (OUTPATIENT)
Dept: GYNECOLOGY | Age: 70
End: 2025-06-19

## 2025-06-19 VITALS
BODY MASS INDEX: 50.02 KG/M2 | OXYGEN SATURATION: 94 % | TEMPERATURE: 96.8 F | WEIGHT: 293 LBS | DIASTOLIC BLOOD PRESSURE: 90 MMHG | HEART RATE: 67 BPM | SYSTOLIC BLOOD PRESSURE: 113 MMHG | RESPIRATION RATE: 16 BRPM | HEIGHT: 64 IN

## 2025-06-19 DIAGNOSIS — Z12.11 COLON CANCER SCREENING: ICD-10-CM

## 2025-06-19 PROCEDURE — 7100000010 HC PHASE II RECOVERY - FIRST 15 MIN: Performed by: INTERNAL MEDICINE

## 2025-06-19 PROCEDURE — 6360000002 HC RX W HCPCS

## 2025-06-19 PROCEDURE — 3700000000 HC ANESTHESIA ATTENDED CARE: Performed by: INTERNAL MEDICINE

## 2025-06-19 PROCEDURE — 88305 TISSUE EXAM BY PATHOLOGIST: CPT

## 2025-06-19 PROCEDURE — 7100000011 HC PHASE II RECOVERY - ADDTL 15 MIN: Performed by: INTERNAL MEDICINE

## 2025-06-19 PROCEDURE — 3700000001 HC ADD 15 MINUTES (ANESTHESIA): Performed by: INTERNAL MEDICINE

## 2025-06-19 PROCEDURE — 2709999900 HC NON-CHARGEABLE SUPPLY: Performed by: INTERNAL MEDICINE

## 2025-06-19 PROCEDURE — 2580000003 HC RX 258: Performed by: ANESTHESIOLOGY

## 2025-06-19 PROCEDURE — 3609010600 HC COLONOSCOPY POLYPECTOMY SNARE/COLD BIOPSY: Performed by: INTERNAL MEDICINE

## 2025-06-19 RX ORDER — PROPOFOL 10 MG/ML
INJECTION, EMULSION INTRAVENOUS
Status: DISCONTINUED | OUTPATIENT
Start: 2025-06-19 | End: 2025-06-19 | Stop reason: SDUPTHER

## 2025-06-19 RX ORDER — SODIUM CHLORIDE, SODIUM LACTATE, POTASSIUM CHLORIDE, CALCIUM CHLORIDE 600; 310; 30; 20 MG/100ML; MG/100ML; MG/100ML; MG/100ML
INJECTION, SOLUTION INTRAVENOUS CONTINUOUS
Status: DISCONTINUED | OUTPATIENT
Start: 2025-06-19 | End: 2025-06-19 | Stop reason: HOSPADM

## 2025-06-19 RX ORDER — LIDOCAINE HYDROCHLORIDE 20 MG/ML
INJECTION, SOLUTION INTRAVENOUS
Status: DISCONTINUED | OUTPATIENT
Start: 2025-06-19 | End: 2025-06-19 | Stop reason: SDUPTHER

## 2025-06-19 RX ORDER — GLYCOPYRROLATE 0.2 MG/ML
INJECTION INTRAMUSCULAR; INTRAVENOUS
Status: DISCONTINUED | OUTPATIENT
Start: 2025-06-19 | End: 2025-06-19 | Stop reason: SDUPTHER

## 2025-06-19 RX ADMIN — PROPOFOL 20 MG: 10 INJECTION, EMULSION INTRAVENOUS at 08:28

## 2025-06-19 RX ADMIN — Medication 60 MG: at 08:26

## 2025-06-19 RX ADMIN — SODIUM CHLORIDE, SODIUM LACTATE, POTASSIUM CHLORIDE, AND CALCIUM CHLORIDE: .6; .31; .03; .02 INJECTION, SOLUTION INTRAVENOUS at 07:19

## 2025-06-19 RX ADMIN — PROPOFOL 110 MCG/KG/MIN: 10 INJECTION, EMULSION INTRAVENOUS at 08:26

## 2025-06-19 RX ADMIN — PROPOFOL 25 MG: 10 INJECTION, EMULSION INTRAVENOUS at 08:27

## 2025-06-19 RX ADMIN — PROPOFOL 10 MG: 10 INJECTION, EMULSION INTRAVENOUS at 08:34

## 2025-06-19 RX ADMIN — GLYCOPYRROLATE 0.2 MG: 0.2 INJECTION INTRAMUSCULAR; INTRAVENOUS at 08:41

## 2025-06-19 ASSESSMENT — PAIN - FUNCTIONAL ASSESSMENT
PAIN_FUNCTIONAL_ASSESSMENT: 0-10

## 2025-06-19 NOTE — PROCEDURES
PROCEDURE NOTE  Date: 2025   Name: Kady Hope  YOB: 1955    Procedures            Colonoscopy Procedure  Note          Patient: Kady Hope  : 1955  CRN:  [unfilled]    Procedure: Colonoscopy with polypectomy (cold snare)    Date:  2025    Surgeon:  Margaret Olvera MD, MD    Referring Physician:  Carmelita Vasques MD    Preoperative Diagnosis:  Colon cancer screening [Z12.11]    Postoperative Diagnosis: A single 8 mm transverse colon polyp removed by cold snare polypectomy, scattered diverticula in the left colon and medium internal hemorrhoids    Anesthesia:  MAC    EBL: Minimal to none.    Indications: This is a 69 y.o. year old female who presents today with screening for colon cancer.      Procedure:   An informed consent was obtained from the patient after explanation of indications, benefits, possible risks and complications of the procedure.  The patient was then taken to the endoscopy suite, placed in the left lateral decubitus position, and the above IV anesthesia was administered.      Digital rectal examination was performed.  With the patient in the left lateral decubitus position the endoscope was inserted through the anorectal area into the rectum. The scope was then advanced through the length of the colon to the cecum, which was identified by the ileocecal valve and appendiceal orifice.  The quality of preparation was adequate.  The scope was carefully withdrawn and the mucosa was fully inspected including retroflexion in the rectum. Findings and interventions are described below.      The patient tolerated the procedure well and was taken to the PACU in good condition.  There were no immediate complications.      Impression:    - A single 8 mm transverse colon polyp removed by cold snare polypectomy, scattered diverticula in the left colon and medium internal hemorrhoids    Recommendations:  Await pathology.  Repeat colonoscopy in 5 years.  High

## 2025-06-19 NOTE — H&P
Gastroenterology Note             Pre-operative History and Physical    Patient: Kady Hope  : 1955  CSN: [id number]    History Obtained From:  patient and/or guardian.     HISTORY OF PRESENT ILLNESS:    The patient is a 69 y.o. female  here for screening for colon cancer.      Past Medical History:    Past Medical History:   Diagnosis Date    Abnormal partial thromboplastin time (PTT) 2024    Allergic rhinitis     Cellulitis of right lower extremity 2024    Chronic back pain 2016    Elevated hemoglobin A1c 2024    Hidradenitis suppurativa     Hyperlipidemia     Hypertension     Leukocytosis     Obesity     ISABELA (obstructive sleep apnea)     uses CPAP nightly    Spondylosis of lumbar region without myelopathy or radiculopathy 2016     Past Surgical History:    Past Surgical History:   Procedure Laterality Date    ANKLE SURGERY      right-broken bones    BREAST BIOPSY      benign     SECTION  ,     baby head too large, heart rate dropped    OTHER SURGICAL HISTORY  1996    sweat glands infected     Medications Prior to Admission:   No current facility-administered medications on file prior to encounter.     Current Outpatient Medications on File Prior to Encounter   Medication Sig Dispense Refill    hydroCHLOROthiazide (HYDRODIURIL) 25 MG tablet Take 1 tablet by mouth once daily 90 tablet 0    atorvastatin (LIPITOR) 10 MG tablet Take 1 tablet by mouth once daily 90 tablet 0    cetirizine (ZYRTEC) 10 MG tablet Take 1 tablet by mouth daily 90 tablet 1    Azelastine HCl 137 MCG/SPRAY SOLN 2 SPRAYS BY NASAL ROUTE 2 TIMES DAILY 90 mL 11    ibuprofen (ADVIL;MOTRIN) 800 MG tablet Take 1 tablet by mouth every 8 hours as needed for Pain      ipratropium (ATROVENT) 0.03 % nasal spray           Allergies:  Bactrim [sulfamethoxazole-trimethoprim]      Social History:   Social History     Tobacco Use    Smoking status: Never    Smokeless tobacco: Never

## 2025-06-19 NOTE — DISCHARGE INSTRUCTIONS
ENDOSCOPY DISCHARGE INSTRUCTIONS:    Call the physician that did your procedure for any questions or concern:    [facility]: [phone]  DR. AYO ULRICH        ACTIVITY:    There are potential side effects to the medications used for sedation and anesthesia during your procedure.  These include:  Dizziness or light-headedness, confusion or memory loss, delayed reaction times, loss of coordination, nausea and vomiting.  Because of your increased risk for injury, we ask that you observe the following precautions:  For the next 24 hours,  DO NOT operate an automobile, bicycle, motorcycle, , power tools or large equipment of any kind.  Do not drink alcohol, sign any legal documents or make any legal decisions for 24 hours.  Do not bend your head over lower than your heart.  DO sit on the side of bed/couch awhile before getting up.  Plan on bedrest or quiet relaxation today.  You may resume normal activities in 24 hours.    DIET:    Your first meal today should be light, avoiding spicy and fatty foods.  If you tolerate this first meal, then you may advance to your regular diet unless otherwise advised by your physician.    NORMAL SYMPTOMS:  -Mild sore throat if you’ve had an EGD   -Gaseous discomfort    NOTIFY YOUR PHYSICIAN IF THESE SYMPTOMS OCCUR:  1. Fever (greater than 100)  5. Increased abdominal bloating  2. Severe pain    6. Excessive bleeding  3. Nausea and vomiting  7. Chest pain                                                                    4. Chills    8. Shortness of breath    ADDITIONAL INSTRUCTIONS:    Biopsy results: Call [facility] for biopsy results in 1 week    Educational Information:    Impression:    - A single 8 mm transverse colon polyp removed by cold snare polypectomy, scattered diverticula in the left colon and medium internal hemorrhoids     Recommendations:  Await pathology.  Repeat colonoscopy in 5 years.  High fiber diet        Please review these discharge

## 2025-06-19 NOTE — ANESTHESIA POSTPROCEDURE EVALUATION
Department of Anesthesiology  Postprocedure Note    Patient: Kady Hope  MRN: [id number]  YOB: 1955  Date of evaluation: 6/19/2025    Procedure Summary       Date: 06/19/25 Room / Location: [facility] ENDO  / [facility]    Anesthesia Start: 0823 Anesthesia Stop: 0849    Procedure: COLONOSCOPY POLYPECTOMY SNARE (Lower GI Region) Diagnosis:       Colon cancer screening      (Colon cancer screening [Z12.11])    Surgeons: Margaret Olvera MD Responsible Provider: Elpidio Morrissey MD    Anesthesia Type: MAC ASA Status: 3            Anesthesia Type: No value filed.    Anthony Phase I: Anthony Score: 10    Anthony Phase II: Anthony Score: 10    Anesthesia Post Evaluation    Patient location during evaluation: PACU  Patient participation: complete - patient participated  Level of consciousness: awake  Pain score: 0  Airway patency: patent  Nausea & Vomiting: no nausea  Cardiovascular status: hemodynamically stable  Respiratory status: acceptable  Hydration status: stable  Pain management: adequate    No notable events documented.

## 2025-06-20 ENCOUNTER — TELEPHONE (OUTPATIENT)
Dept: PRIMARY CARE CLINIC | Age: 70
End: 2025-06-20

## 2025-06-20 NOTE — TELEPHONE ENCOUNTER
Pt called stating that she had a colonoscopy on 6/19/25 and has been bleeding since.  Pt sts she thought it was vaginal bleeding and sts she called to speak with Dr Eastman.  Pt sts Dr Eastman advised her that it more than likely is not vaginal and advised pt to call the GI office.  Pt sts she is unsure of what to do.   Advised pt that Dr Vasques is out of the office until 6/23/25.    I called and relayed msg to Dr Vasques and as per discussion with Dr Vasques, attempted to call pt.  Pt's voicemail came on and left msg with pt that as per discussion with Dr Vasques that some bleeding is normal for up to 5 days after the procedure, thom with wiping or bowel movement.  Advised pt if it is gushing blood to go to the ER.

## 2025-06-25 DIAGNOSIS — I10 PRIMARY HYPERTENSION: ICD-10-CM

## 2025-06-25 DIAGNOSIS — E78.2 MIXED HYPERLIPIDEMIA: ICD-10-CM

## 2025-06-25 RX ORDER — ATORVASTATIN CALCIUM 10 MG/1
10 TABLET, FILM COATED ORAL DAILY
Qty: 90 TABLET | Refills: 0 | Status: SHIPPED | OUTPATIENT
Start: 2025-06-25

## 2025-06-25 RX ORDER — HYDROCHLOROTHIAZIDE 25 MG/1
25 TABLET ORAL DAILY
Qty: 90 TABLET | Refills: 0 | Status: SHIPPED | OUTPATIENT
Start: 2025-06-25

## 2025-06-27 ENCOUNTER — TELEPHONE (OUTPATIENT)
Dept: PRIMARY CARE CLINIC | Age: 70
End: 2025-06-27

## 2025-06-27 NOTE — TELEPHONE ENCOUNTER
Spoke with the patient, advised her that indeed, she did not have a CTAP done and advised to follow Dr. Basilio' recommendations for this and the cystoscopy. Call back/ED precautions discussed. She was amenable to this plan.

## 2025-07-17 ENCOUNTER — OFFICE VISIT (OUTPATIENT)
Dept: PRIMARY CARE CLINIC | Age: 70
End: 2025-07-17

## 2025-07-17 VITALS
BODY MASS INDEX: 50.02 KG/M2 | SYSTOLIC BLOOD PRESSURE: 128 MMHG | DIASTOLIC BLOOD PRESSURE: 72 MMHG | OXYGEN SATURATION: 93 % | WEIGHT: 293 LBS | TEMPERATURE: 98.6 F | RESPIRATION RATE: 14 BRPM | HEIGHT: 64 IN | HEART RATE: 75 BPM

## 2025-07-17 DIAGNOSIS — Z01.818 PREOP EXAMINATION: Primary | ICD-10-CM

## 2025-07-17 DIAGNOSIS — R31.0 GROSS HEMATURIA: ICD-10-CM

## 2025-07-17 PROBLEM — R79.89 ABNORMAL CBC: Status: RESOLVED | Noted: 2025-05-30 | Resolved: 2025-07-17

## 2025-07-17 PROBLEM — R73.09 ABNORMAL GLUCOSE: Status: RESOLVED | Noted: 2025-05-16 | Resolved: 2025-07-17

## 2025-07-17 ASSESSMENT — ENCOUNTER SYMPTOMS
VOMITING: 0
SHORTNESS OF BREATH: 0
COLOR CHANGE: 0
BLOOD IN STOOL: 0
RHINORRHEA: 0
COUGH: 0
DIARRHEA: 0
NAUSEA: 0
ABDOMINAL PAIN: 0

## 2025-07-17 NOTE — PROGRESS NOTES
Kady Hope is a 69 y.o. year old female here for:    Chief Complaint:    Chief Complaint   Patient presents with    Pre-op Exam    Hematuria     Subjective:    Today, her current concerns include:    HPI:  #Hematuria  - Onset: 2 months ago onset  - Context: The patient is here for pre-op risk stratification, due to have cystoscopy with Dr. Nunes on 2025. Denied known family or personal hx of serious issues with anesthesia. Denied use of blood thinners.  - Quality: Bright red blood with a clot  - Severity: No pain  - Timing/Duration: Intermittent, none for the past 2 weeks  - Progression: Stable  - Associated Symptoms: Per ROS as otherwise stated in this note  - Previous occurrence: Never before    Past Medical History:   Diagnosis Date    Abnormal partial thromboplastin time (PTT) 2024    Allergic rhinitis     Cellulitis of right lower extremity 2024    Chronic back pain 2016    Elevated hemoglobin A1c 2024    Hidradenitis suppurativa     Hyperlipidemia     Hypertension     Leukocytosis     Obesity     ISABELA (obstructive sleep apnea)     uses CPAP nightly    Spondylosis of lumbar region without myelopathy or radiculopathy 2016     Social History     Tobacco Use    Smoking status: Never    Smokeless tobacco: Never   Vaping Use    Vaping status: Never Used   Substance Use Topics    Alcohol use: No    Drug use: No     Family History   Problem Relation Age of Onset    Heart Disease Father     Breast Cancer Neg Hx     Colon Cancer Neg Hx      Past Surgical History:   Procedure Laterality Date    ANKLE SURGERY      right-broken bones    BREAST BIOPSY      benign     SECTION  1989    baby head too large, heart rate dropped    COLONOSCOPY N/A 2025    COLONOSCOPY POLYPECTOMY SNARE performed by Margaret Olvera MD at [facility] ENDOSCOPY    OTHER SURGICAL HISTORY  1996    sweat glands infected       Current Outpatient Medications:

## 2025-07-17 NOTE — ASSESSMENT & PLAN NOTE
Counseled that we do not \"clear\" for surgery, but instead we \"risk stratify\" for surgery as ultimately surgical clearance is up to the operating surgeon. Provided copy of ACS NQIP report generated as applicable.     Using the ACS NSQIP Risk Calculator for \" Laparoscopy, surgical; ureteroneocystostomy without cystoscopy and ureteral stent placement\", which is the closest in the calculator for the patient's planned procedure as specified in the HPI above (and even more aggressive than her planned procedure), given the patient's specific medical hx, the patient is at above average risk for the following as stated per the calculator: any complication, pneumonia, cardiac complication, venous thromboembolism, surgical site infection, renal failure, death and discharge to nursing or rehab facility. She is at average risk for serious complication, urinary tract infection and return to OR. She is at below average risk for readmission  and sepsis. Overall risk is above average with highest percentage risk being 10% for \"any complication\".    Discussed to take her statin medication prior to her procedure as studies have shown taking these prior to surgeries/procedures may confer beneficial cardiovascular benefits and this is a low harm intervention.    Cessation of PRN NSAIDs and timing per urology.

## 2025-07-17 NOTE — ASSESSMENT & PLAN NOTE
Poorly controlled. The patient is here for pre-op risk stratification, due to have cystoscopy with Dr. Nunes on 7/24/2025. Denied known family or personal hx of serious issues with anesthesia. Denied use of blood thinners. Please see Pre-op notes for more.

## 2025-07-24 LAB
ABO/RH: NORMAL
ANTIBODY SCREEN: NORMAL
EXPIRATION DATE: NORMAL

## 2025-07-25 ENCOUNTER — TELEPHONE (OUTPATIENT)
Dept: PRIMARY CARE CLINIC | Age: 70
End: 2025-07-25

## 2025-07-31 ENCOUNTER — OFFICE VISIT (OUTPATIENT)
Dept: PRIMARY CARE CLINIC | Age: 70
End: 2025-07-31

## 2025-07-31 ENCOUNTER — RESULTS FOLLOW-UP (OUTPATIENT)
Dept: PRIMARY CARE CLINIC | Age: 70
End: 2025-07-31

## 2025-07-31 VITALS
TEMPERATURE: 98.3 F | OXYGEN SATURATION: 94 % | HEIGHT: 64 IN | HEART RATE: 84 BPM | SYSTOLIC BLOOD PRESSURE: 122 MMHG | BODY MASS INDEX: 50.02 KG/M2 | DIASTOLIC BLOOD PRESSURE: 72 MMHG | RESPIRATION RATE: 12 BRPM | WEIGHT: 293 LBS

## 2025-07-31 DIAGNOSIS — R58 BLOOD IN TOILET BOWL: ICD-10-CM

## 2025-07-31 DIAGNOSIS — Z01.818 PRE-OP EXAM: Primary | ICD-10-CM

## 2025-07-31 ASSESSMENT — ENCOUNTER SYMPTOMS
BLOOD IN STOOL: 0
DIARRHEA: 0
NAUSEA: 0
SHORTNESS OF BREATH: 0
COLOR CHANGE: 0
COUGH: 0
ABDOMINAL PAIN: 0
RHINORRHEA: 0
VOMITING: 0

## 2025-07-31 NOTE — ASSESSMENT & PLAN NOTE
Counseled that we do not \"clear\" for surgery, but instead we \"risk stratify\" for surgery as ultimately surgical clearance is up to the operating surgeon. Provided copy of ACS NQIP report generated as applicable. Also reviewed that preo-op labs/testing requested by surgical team were at the discretion and comfort level of that team and not always covered by insurance, and I could not provide an exact out-of-pocket cost, as this varies by insurance. Patient elected to to proceed with said testing as indicated in orders below.    Using the ACS NSQIP Risk Calculator for \"Total abdominal hysterectomy (corpus and cervix), with or without removal of tube(s), with or without removal of ovary(s)\", which is the closest in the calculator for the patient's planned procedure as specified in the HPI above, given the patient's specific medical hx, the patient is at above average risk for the following as stated per the calculator: serious complication, any complication, pneumonia, cardiac complication, surgical site infection, venous thromboembolism, renal failure, readmission, return to OR, death, discharge to nursing or rehab facility and sepsis. She is at average risk for urinary tract infection. Overall risk is above average. The highest risk percentage is 11.4% for \"Any complication\".    Discussed to take her statin medication prior to her procedure as studies have shown taking these prior to surgeries/procedures may confer beneficial cardiovascular benefits and this is a low harm intervention.     ECG: Unchanged from prior noting RBBB, she has been evaluated by cardiology in the past.    Cessation of PRN NSAIDs and timing per surgical team.

## 2025-07-31 NOTE — ASSESSMENT & PLAN NOTE
Poorly controlled. The patient is here for pre-op risk stratification, due to have hysterectomy with bilateral salpingo-oophorectomy with Dr. Hernandez on 8/13/2025. Denied known family or personal hx of serious issues with anesthesia. Denied use of blood thinners. Please see Pre-Op for more.

## 2025-07-31 NOTE — PROGRESS NOTES
Kady Hope is a 69 y.o. year old female here for:    Chief Complaint:    Chief Complaint   Patient presents with    Pre-op Exam    Vaginal Bleeding     Subjective:    Today, her current concerns include:    HPI:  #Vaginal Bleeding  - Onset: 1 month ago or so  - Context: The patient is here for pre-op risk stratification, due to have hysterectomy with bilateral salpingo-oophorectomy with Dr. Hernandez on 2025. Denied known family or personal hx of serious issues with anesthesia. Denied use of blood thinners.   - Quality: Bright red blood  - Severity: No pain  - Timing/Duration: Intermittent and none recently  - Progression: Stable  - Associated Symptoms: Per ROS as otherwise stated in this note  - Previous occurrence: Never before    Past Medical History:   Diagnosis Date    Abnormal partial thromboplastin time (PTT) 2024    Allergic rhinitis     Cellulitis of right lower extremity 2024    Chronic back pain 2016    Elevated hemoglobin A1c 2024    Hidradenitis suppurativa     Hyperlipidemia     Hypertension     Leukocytosis     Obesity     ISABELA (obstructive sleep apnea)     uses CPAP nightly    Spondylosis of lumbar region without myelopathy or radiculopathy 2016     Social History     Tobacco Use    Smoking status: Never    Smokeless tobacco: Never   Vaping Use    Vaping status: Never Used   Substance Use Topics    Alcohol use: No    Drug use: No     Family History   Problem Relation Age of Onset    Heart Disease Father     Breast Cancer Neg Hx     Colon Cancer Neg Hx      Past Surgical History:   Procedure Laterality Date    ANKLE SURGERY      right-broken bones    BREAST BIOPSY      benign     SECTION  ,     baby head too large, heart rate dropped    COLONOSCOPY N/A 2025    COLONOSCOPY POLYPECTOMY SNARE performed by Margaret Olvera MD at [facility] ENDOSCOPY    OTHER SURGICAL HISTORY  1996    sweat glands infected       Current Outpatient

## 2025-08-06 DIAGNOSIS — J30.2 SEASONAL ALLERGIC RHINITIS, UNSPECIFIED TRIGGER: ICD-10-CM

## 2025-08-06 RX ORDER — AZELASTINE 1 MG/ML
SPRAY, METERED NASAL
Qty: 90 ML | Refills: 0 | Status: SHIPPED | OUTPATIENT
Start: 2025-08-06

## 2025-08-07 ENCOUNTER — TELEPHONE (OUTPATIENT)
Dept: PRIMARY CARE CLINIC | Age: 70
End: 2025-08-07

## 2025-08-08 ENCOUNTER — HOSPITAL ENCOUNTER (OUTPATIENT)
Dept: GENERAL RADIOLOGY | Age: 70
Discharge: HOME | End: 2025-08-08
Payer: MEDICARE

## 2025-08-08 DIAGNOSIS — Z01.818 PRE-OP EXAM: ICD-10-CM

## 2025-08-08 LAB
ALBUMIN SERPL-MCNC: 4 G/DL (ref 3.4–5)
ALBUMIN/GLOB SERPL: 1.3 {RATIO} (ref 1.1–2.2)
ALP SERPL-CCNC: 103 U/L (ref 40–129)
ALT SERPL-CCNC: 18 U/L (ref 10–40)
ANION GAP SERPL CALCULATED.3IONS-SCNC: 12 MMOL/L (ref 3–16)
AST SERPL-CCNC: 22 U/L (ref 15–37)
BASOPHILS # BLD: 0.1 K/UL (ref 0–0.2)
BASOPHILS NFR BLD: 0.8 %
BILIRUB SERPL-MCNC: 0.5 MG/DL (ref 0–1)
BUN SERPL-MCNC: 15 MG/DL (ref 7–20)
CALCIUM SERPL-MCNC: 9.7 MG/DL (ref 8.3–10.6)
CHLORIDE SERPL-SCNC: 101 MMOL/L (ref 99–110)
CO2 SERPL-SCNC: 27 MMOL/L (ref 21–32)
CREAT SERPL-MCNC: 0.9 MG/DL (ref 0.6–1.2)
DEPRECATED RDW RBC AUTO: 15.1 % (ref 12.4–15.4)
EOSINOPHIL # BLD: 0.2 K/UL (ref 0–0.6)
EOSINOPHIL NFR BLD: 2.1 %
GFR SERPLBLD CREATININE-BSD FMLA CKD-EPI: 68 ML/MIN/{1.73_M2}
GLUCOSE SERPL-MCNC: 90 MG/DL (ref 70–99)
HCT VFR BLD AUTO: 40.4 % (ref 36–48)
HGB BLD-MCNC: 13.3 G/DL (ref 12–16)
LYMPHOCYTES # BLD: 1.7 K/UL (ref 1–5.1)
LYMPHOCYTES NFR BLD: 18.8 %
MCH RBC QN AUTO: 28.4 PG (ref 26–34)
MCHC RBC AUTO-ENTMCNC: 33 G/DL (ref 31–36)
MCV RBC AUTO: 86.3 FL (ref 80–100)
MONOCYTES # BLD: 0.7 K/UL (ref 0–1.3)
MONOCYTES NFR BLD: 7.9 %
NEUTROPHILS # BLD: 6.3 K/UL (ref 1.7–7.7)
NEUTROPHILS NFR BLD: 70.4 %
PLATELET # BLD AUTO: 206 K/UL (ref 135–450)
PMV BLD AUTO: 10.6 FL (ref 5–10.5)
POTASSIUM SERPL-SCNC: 4.3 MMOL/L (ref 3.5–5.1)
PROT SERPL-MCNC: 7.2 G/DL (ref 6.4–8.2)
RBC # BLD AUTO: 4.68 M/UL (ref 4–5.2)
SODIUM SERPL-SCNC: 140 MMOL/L (ref 136–145)
WBC # BLD AUTO: 8.9 K/UL (ref 4–11)

## 2025-08-08 PROCEDURE — 71046 X-RAY EXAM CHEST 2 VIEWS: CPT

## 2025-08-12 ENCOUNTER — ANESTHESIA EVENT (OUTPATIENT)
Dept: OPERATING ROOM | Age: 70
End: 2025-08-12
Payer: MEDICARE

## 2025-08-13 ENCOUNTER — ANESTHESIA (OUTPATIENT)
Dept: OPERATING ROOM | Age: 70
End: 2025-08-13
Payer: MEDICARE

## 2025-08-13 ENCOUNTER — HOSPITAL ENCOUNTER (OUTPATIENT)
Age: 70
Setting detail: OUTPATIENT SURGERY
Discharge: HOME | End: 2025-08-13
Attending: OBSTETRICS & GYNECOLOGY | Admitting: OBSTETRICS & GYNECOLOGY
Payer: MEDICARE

## 2025-08-13 VITALS
OXYGEN SATURATION: 93 % | TEMPERATURE: 97.2 F | DIASTOLIC BLOOD PRESSURE: 76 MMHG | HEIGHT: 64 IN | BODY MASS INDEX: 50.02 KG/M2 | RESPIRATION RATE: 12 BRPM | SYSTOLIC BLOOD PRESSURE: 146 MMHG | HEART RATE: 69 BPM | WEIGHT: 293 LBS

## 2025-08-13 DIAGNOSIS — N85.02 ENDOMETRIAL INTRAEPITHELIAL NEOPLASIA (EIN): ICD-10-CM

## 2025-08-13 LAB
ABO/RH: NORMAL
ANTIBODY SCREEN: NORMAL

## 2025-08-13 PROCEDURE — 3600000009 HC SURGERY ROBOT BASE: Performed by: OBSTETRICS & GYNECOLOGY

## 2025-08-13 PROCEDURE — 2709999900 HC NON-CHARGEABLE SUPPLY: Performed by: OBSTETRICS & GYNECOLOGY

## 2025-08-13 PROCEDURE — 6360000002 HC RX W HCPCS

## 2025-08-13 PROCEDURE — 3700000000 HC ANESTHESIA ATTENDED CARE: Performed by: OBSTETRICS & GYNECOLOGY

## 2025-08-13 PROCEDURE — 2500000003 HC RX 250 WO HCPCS

## 2025-08-13 PROCEDURE — 88305 TISSUE EXAM BY PATHOLOGIST: CPT

## 2025-08-13 PROCEDURE — 2580000003 HC RX 258: Performed by: ANESTHESIOLOGY

## 2025-08-13 PROCEDURE — 7100000011 HC PHASE II RECOVERY - ADDTL 15 MIN: Performed by: OBSTETRICS & GYNECOLOGY

## 2025-08-13 PROCEDURE — 7100000010 HC PHASE II RECOVERY - FIRST 15 MIN: Performed by: OBSTETRICS & GYNECOLOGY

## 2025-08-13 PROCEDURE — 2580000003 HC RX 258: Performed by: OBSTETRICS & GYNECOLOGY

## 2025-08-13 PROCEDURE — 6370000000 HC RX 637 (ALT 250 FOR IP): Performed by: ANESTHESIOLOGY

## 2025-08-13 PROCEDURE — 86900 BLOOD TYPING SEROLOGIC ABO: CPT

## 2025-08-13 PROCEDURE — 88307 TISSUE EXAM BY PATHOLOGIST: CPT

## 2025-08-13 PROCEDURE — 6360000002 HC RX W HCPCS: Performed by: ANESTHESIOLOGY

## 2025-08-13 PROCEDURE — 2720000010 HC SURG SUPPLY STERILE: Performed by: OBSTETRICS & GYNECOLOGY

## 2025-08-13 PROCEDURE — 88342 IMHCHEM/IMCYTCHM 1ST ANTB: CPT

## 2025-08-13 PROCEDURE — 3600000019 HC SURGERY ROBOT ADDTL 15MIN: Performed by: OBSTETRICS & GYNECOLOGY

## 2025-08-13 PROCEDURE — 3700000001 HC ADD 15 MINUTES (ANESTHESIA): Performed by: OBSTETRICS & GYNECOLOGY

## 2025-08-13 PROCEDURE — 6360000002 HC RX W HCPCS: Performed by: OBSTETRICS & GYNECOLOGY

## 2025-08-13 PROCEDURE — 64488 TAP BLOCK BI INJECTION: CPT | Performed by: ANESTHESIOLOGY

## 2025-08-13 PROCEDURE — 88112 CYTOPATH CELL ENHANCE TECH: CPT

## 2025-08-13 PROCEDURE — 86850 RBC ANTIBODY SCREEN: CPT

## 2025-08-13 PROCEDURE — 88341 IMHCHEM/IMCYTCHM EA ADD ANTB: CPT

## 2025-08-13 PROCEDURE — S2900 ROBOTIC SURGICAL SYSTEM: HCPCS | Performed by: OBSTETRICS & GYNECOLOGY

## 2025-08-13 PROCEDURE — 7100000000 HC PACU RECOVERY - FIRST 15 MIN: Performed by: OBSTETRICS & GYNECOLOGY

## 2025-08-13 PROCEDURE — 86901 BLOOD TYPING SEROLOGIC RH(D): CPT

## 2025-08-13 PROCEDURE — 7100000001 HC PACU RECOVERY - ADDTL 15 MIN: Performed by: OBSTETRICS & GYNECOLOGY

## 2025-08-13 PROCEDURE — 2580000003 HC RX 258

## 2025-08-13 PROCEDURE — 2500000003 HC RX 250 WO HCPCS: Performed by: OBSTETRICS & GYNECOLOGY

## 2025-08-13 RX ORDER — SODIUM CHLORIDE, SODIUM LACTATE, POTASSIUM CHLORIDE, CALCIUM CHLORIDE 600; 310; 30; 20 MG/100ML; MG/100ML; MG/100ML; MG/100ML
INJECTION, SOLUTION INTRAVENOUS CONTINUOUS
Status: DISCONTINUED | OUTPATIENT
Start: 2025-08-13 | End: 2025-08-13 | Stop reason: HOSPADM

## 2025-08-13 RX ORDER — ONDANSETRON 4 MG/1
4 TABLET, FILM COATED ORAL EVERY 8 HOURS PRN
Qty: 20 TABLET | Refills: 0 | Status: SHIPPED | OUTPATIENT
Start: 2025-08-13

## 2025-08-13 RX ORDER — PROCHLORPERAZINE EDISYLATE 5 MG/ML
5 INJECTION INTRAMUSCULAR; INTRAVENOUS
Status: DISCONTINUED | OUTPATIENT
Start: 2025-08-13 | End: 2025-08-13 | Stop reason: HOSPADM

## 2025-08-13 RX ORDER — OXYCODONE HYDROCHLORIDE 5 MG/1
5 TABLET ORAL
Status: COMPLETED | OUTPATIENT
Start: 2025-08-13 | End: 2025-08-13

## 2025-08-13 RX ORDER — SUCCINYLCHOLINE CHLORIDE 20 MG/ML
INJECTION INTRAMUSCULAR; INTRAVENOUS
Status: DISCONTINUED | OUTPATIENT
Start: 2025-08-13 | End: 2025-08-13 | Stop reason: SDUPTHER

## 2025-08-13 RX ORDER — SODIUM CHLORIDE 0.9 % (FLUSH) 0.9 %
5-40 SYRINGE (ML) INJECTION EVERY 12 HOURS SCHEDULED
Status: DISCONTINUED | OUTPATIENT
Start: 2025-08-13 | End: 2025-08-13 | Stop reason: HOSPADM

## 2025-08-13 RX ORDER — ROCURONIUM BROMIDE 10 MG/ML
INJECTION, SOLUTION INTRAVENOUS
Status: DISCONTINUED | OUTPATIENT
Start: 2025-08-13 | End: 2025-08-13 | Stop reason: SDUPTHER

## 2025-08-13 RX ORDER — METHOCARBAMOL 100 MG/ML
INJECTION, SOLUTION INTRAMUSCULAR; INTRAVENOUS
Status: DISCONTINUED | OUTPATIENT
Start: 2025-08-13 | End: 2025-08-13 | Stop reason: SDUPTHER

## 2025-08-13 RX ORDER — INDOCYANINE GREEN AND WATER 25 MG
KIT INJECTION PRN
Status: DISCONTINUED | OUTPATIENT
Start: 2025-08-13 | End: 2025-08-13 | Stop reason: ALTCHOICE

## 2025-08-13 RX ORDER — KETAMINE HCL IN NACL, ISO-OSM 20 MG/2 ML
SYRINGE (ML) INJECTION
Status: DISCONTINUED | OUTPATIENT
Start: 2025-08-13 | End: 2025-08-13 | Stop reason: SDUPTHER

## 2025-08-13 RX ORDER — ONDANSETRON 2 MG/ML
INJECTION INTRAMUSCULAR; INTRAVENOUS
Status: DISCONTINUED | OUTPATIENT
Start: 2025-08-13 | End: 2025-08-13 | Stop reason: SDUPTHER

## 2025-08-13 RX ORDER — KETOROLAC TROMETHAMINE 30 MG/ML
INJECTION, SOLUTION INTRAMUSCULAR; INTRAVENOUS
Status: DISCONTINUED | OUTPATIENT
Start: 2025-08-13 | End: 2025-08-13 | Stop reason: SDUPTHER

## 2025-08-13 RX ORDER — OXYCODONE HYDROCHLORIDE 5 MG/1
5 TABLET ORAL EVERY 12 HOURS PRN
Qty: 10 TABLET | Refills: 0 | Status: SHIPPED | OUTPATIENT
Start: 2025-08-13 | End: 2025-08-18

## 2025-08-13 RX ORDER — LABETALOL HYDROCHLORIDE 5 MG/ML
10 INJECTION, SOLUTION INTRAVENOUS
Status: DISCONTINUED | OUTPATIENT
Start: 2025-08-13 | End: 2025-08-13 | Stop reason: HOSPADM

## 2025-08-13 RX ORDER — MEPERIDINE HYDROCHLORIDE 25 MG/ML
12.5 INJECTION INTRAMUSCULAR; INTRAVENOUS; SUBCUTANEOUS EVERY 5 MIN PRN
Status: DISCONTINUED | OUTPATIENT
Start: 2025-08-13 | End: 2025-08-13 | Stop reason: HOSPADM

## 2025-08-13 RX ORDER — DEXAMETHASONE SODIUM PHOSPHATE 4 MG/ML
INJECTION, SOLUTION INTRA-ARTICULAR; INTRALESIONAL; INTRAMUSCULAR; INTRAVENOUS; SOFT TISSUE
Status: DISCONTINUED | OUTPATIENT
Start: 2025-08-13 | End: 2025-08-13 | Stop reason: SDUPTHER

## 2025-08-13 RX ORDER — IPRATROPIUM BROMIDE AND ALBUTEROL SULFATE 2.5; .5 MG/3ML; MG/3ML
1 SOLUTION RESPIRATORY (INHALATION)
Status: DISCONTINUED | OUTPATIENT
Start: 2025-08-13 | End: 2025-08-13 | Stop reason: HOSPADM

## 2025-08-13 RX ORDER — PROPOFOL 10 MG/ML
INJECTION, EMULSION INTRAVENOUS
Status: DISCONTINUED | OUTPATIENT
Start: 2025-08-13 | End: 2025-08-13 | Stop reason: SDUPTHER

## 2025-08-13 RX ORDER — ONDANSETRON 2 MG/ML
4 INJECTION INTRAMUSCULAR; INTRAVENOUS
Status: DISCONTINUED | OUTPATIENT
Start: 2025-08-13 | End: 2025-08-13 | Stop reason: HOSPADM

## 2025-08-13 RX ORDER — DOCUSATE SODIUM 100 MG/1
100 CAPSULE, LIQUID FILLED ORAL 2 TIMES DAILY
Qty: 60 CAPSULE | Refills: 1 | Status: SHIPPED | OUTPATIENT
Start: 2025-08-13

## 2025-08-13 RX ORDER — DIPHENHYDRAMINE HYDROCHLORIDE 50 MG/ML
12.5 INJECTION, SOLUTION INTRAMUSCULAR; INTRAVENOUS
Status: DISCONTINUED | OUTPATIENT
Start: 2025-08-13 | End: 2025-08-13 | Stop reason: HOSPADM

## 2025-08-13 RX ORDER — FENTANYL CITRATE 50 UG/ML
25 INJECTION, SOLUTION INTRAMUSCULAR; INTRAVENOUS EVERY 5 MIN PRN
Status: DISCONTINUED | OUTPATIENT
Start: 2025-08-13 | End: 2025-08-13 | Stop reason: HOSPADM

## 2025-08-13 RX ORDER — SODIUM CHLORIDE 9 MG/ML
INJECTION, SOLUTION INTRAVENOUS PRN
Status: DISCONTINUED | OUTPATIENT
Start: 2025-08-13 | End: 2025-08-13 | Stop reason: HOSPADM

## 2025-08-13 RX ORDER — LIDOCAINE HYDROCHLORIDE 20 MG/ML
INJECTION, SOLUTION INTRAVENOUS
Status: DISCONTINUED | OUTPATIENT
Start: 2025-08-13 | End: 2025-08-13 | Stop reason: SDUPTHER

## 2025-08-13 RX ORDER — MAGNESIUM HYDROXIDE 1200 MG/15ML
LIQUID ORAL CONTINUOUS PRN
Status: COMPLETED | OUTPATIENT
Start: 2025-08-13 | End: 2025-08-13

## 2025-08-13 RX ORDER — BUPIVACAINE HYDROCHLORIDE 2.5 MG/ML
INJECTION, SOLUTION EPIDURAL; INFILTRATION; INTRACAUDAL; PERINEURAL
Status: DISCONTINUED | OUTPATIENT
Start: 2025-08-13 | End: 2025-08-13 | Stop reason: SDUPTHER

## 2025-08-13 RX ORDER — HYDROMORPHONE HYDROCHLORIDE 1 MG/ML
0.5 INJECTION, SOLUTION INTRAMUSCULAR; INTRAVENOUS; SUBCUTANEOUS EVERY 5 MIN PRN
Status: DISCONTINUED | OUTPATIENT
Start: 2025-08-13 | End: 2025-08-13 | Stop reason: HOSPADM

## 2025-08-13 RX ORDER — SODIUM CHLORIDE, SODIUM LACTATE, POTASSIUM CHLORIDE, CALCIUM CHLORIDE 600; 310; 30; 20 MG/100ML; MG/100ML; MG/100ML; MG/100ML
INJECTION, SOLUTION INTRAVENOUS
Status: DISCONTINUED | OUTPATIENT
Start: 2025-08-13 | End: 2025-08-13 | Stop reason: SDUPTHER

## 2025-08-13 RX ORDER — SODIUM CHLORIDE 0.9 % (FLUSH) 0.9 %
5-40 SYRINGE (ML) INJECTION PRN
Status: DISCONTINUED | OUTPATIENT
Start: 2025-08-13 | End: 2025-08-13 | Stop reason: HOSPADM

## 2025-08-13 RX ADMIN — KETOROLAC TROMETHAMINE 30 MG: 30 INJECTION, SOLUTION INTRAMUSCULAR at 09:34

## 2025-08-13 RX ADMIN — METHOCARBAMOL 1000 MG: 100 INJECTION, SOLUTION INTRAMUSCULAR; INTRAVENOUS at 08:28

## 2025-08-13 RX ADMIN — OXYCODONE HYDROCHLORIDE 5 MG: 5 TABLET ORAL at 11:10

## 2025-08-13 RX ADMIN — SUCCINYLCHOLINE CHLORIDE 200 MG: 20 INJECTION, SOLUTION INTRAMUSCULAR; INTRAVENOUS at 07:51

## 2025-08-13 RX ADMIN — SUGAMMADEX 200 MG: 100 INJECTION, SOLUTION INTRAVENOUS at 09:43

## 2025-08-13 RX ADMIN — BUPIVACAINE 20 ML: 13.3 INJECTION, SUSPENSION, LIPOSOMAL INFILTRATION at 09:50

## 2025-08-13 RX ADMIN — DEXMEDETOMIDINE HYDROCHLORIDE 4 MCG: 100 INJECTION, SOLUTION INTRAVENOUS at 09:08

## 2025-08-13 RX ADMIN — Medication 20 MG: at 08:07

## 2025-08-13 RX ADMIN — ROCURONIUM BROMIDE 90 MG: 10 INJECTION, SOLUTION INTRAVENOUS at 08:00

## 2025-08-13 RX ADMIN — SODIUM CHLORIDE 3000 MG: 9 INJECTION, SOLUTION INTRAVENOUS at 07:58

## 2025-08-13 RX ADMIN — HYDROMORPHONE HYDROCHLORIDE 1 MG: 1 INJECTION, SOLUTION INTRAMUSCULAR; INTRAVENOUS; SUBCUTANEOUS at 07:41

## 2025-08-13 RX ADMIN — HYDROMORPHONE HYDROCHLORIDE 1 MG: 1 INJECTION, SOLUTION INTRAMUSCULAR; INTRAVENOUS; SUBCUTANEOUS at 08:29

## 2025-08-13 RX ADMIN — DEXMEDETOMIDINE HYDROCHLORIDE 4 MCG: 100 INJECTION, SOLUTION INTRAVENOUS at 10:10

## 2025-08-13 RX ADMIN — ONDANSETRON 4 MG: 2 INJECTION, SOLUTION INTRAMUSCULAR; INTRAVENOUS at 09:34

## 2025-08-13 RX ADMIN — PROPOFOL 200 MG: 10 INJECTION, EMULSION INTRAVENOUS at 07:50

## 2025-08-13 RX ADMIN — LIDOCAINE HYDROCHLORIDE 100 MG: 20 INJECTION, SOLUTION INTRAVENOUS at 07:50

## 2025-08-13 RX ADMIN — BUPIVACAINE HYDROCHLORIDE 30 ML: 2.5 INJECTION, SOLUTION EPIDURAL; INFILTRATION; INTRACAUDAL; PERINEURAL at 09:50

## 2025-08-13 RX ADMIN — SODIUM CHLORIDE, SODIUM LACTATE, POTASSIUM CHLORIDE, AND CALCIUM CHLORIDE: .6; .31; .03; .02 INJECTION, SOLUTION INTRAVENOUS at 07:31

## 2025-08-13 RX ADMIN — SODIUM CHLORIDE, SODIUM LACTATE, POTASSIUM CHLORIDE, AND CALCIUM CHLORIDE: .6; .31; .03; .02 INJECTION, SOLUTION INTRAVENOUS at 07:59

## 2025-08-13 RX ADMIN — DEXMEDETOMIDINE HYDROCHLORIDE 4 MCG: 100 INJECTION, SOLUTION INTRAVENOUS at 08:24

## 2025-08-13 RX ADMIN — SUGAMMADEX 200 MG: 100 INJECTION, SOLUTION INTRAVENOUS at 09:54

## 2025-08-13 RX ADMIN — ROCURONIUM BROMIDE 10 MG: 10 INJECTION, SOLUTION INTRAVENOUS at 07:50

## 2025-08-13 RX ADMIN — DEXAMETHASONE SODIUM PHOSPHATE 8 MG: 4 INJECTION INTRA-ARTICULAR; INTRALESIONAL; INTRAMUSCULAR; INTRAVENOUS; SOFT TISSUE at 07:55

## 2025-08-13 ASSESSMENT — PAIN SCALES - GENERAL
PAINLEVEL_OUTOF10: 0
PAINLEVEL_OUTOF10: 4
PAINLEVEL_OUTOF10: 0

## 2025-08-13 ASSESSMENT — PAIN - FUNCTIONAL ASSESSMENT
PAIN_FUNCTIONAL_ASSESSMENT: 0-10

## 2025-08-13 ASSESSMENT — PAIN DESCRIPTION - ORIENTATION: ORIENTATION: MID

## 2025-08-13 ASSESSMENT — PAIN DESCRIPTION - DESCRIPTORS: DESCRIPTORS: ACHING

## 2025-08-13 ASSESSMENT — PAIN DESCRIPTION - LOCATION: LOCATION: ABDOMEN

## 2025-08-14 ENCOUNTER — RESULTS FOLLOW-UP (OUTPATIENT)
Dept: PRIMARY CARE CLINIC | Age: 70
End: 2025-08-14

## 2025-08-16 PROBLEM — Z01.818 PREOP EXAMINATION: Status: RESOLVED | Noted: 2025-07-17 | Resolved: 2025-08-16

## 2025-08-25 DIAGNOSIS — J30.9 ALLERGIC RHINITIS, UNSPECIFIED SEASONALITY, UNSPECIFIED TRIGGER: ICD-10-CM

## 2025-08-25 RX ORDER — CETIRIZINE HYDROCHLORIDE 10 MG/1
10 TABLET ORAL DAILY
Qty: 90 TABLET | Refills: 0 | Status: SHIPPED | OUTPATIENT
Start: 2025-08-25

## 2025-08-28 ENCOUNTER — TELEPHONE (OUTPATIENT)
Dept: PRIMARY CARE CLINIC | Age: 70
End: 2025-08-28

## 2025-08-30 PROBLEM — Z01.818 PRE-OP EXAM: Status: RESOLVED | Noted: 2025-07-31 | Resolved: 2025-08-30

## (undated) DEVICE — SYRINGE MED 10ML SLIP TIP BLNT FILL AND LUERLOCK DISP

## (undated) DEVICE — Device

## (undated) DEVICE — DRAPE SURG W31XL48IN POLYPR STD CUF L6IN SMS LEG PR SFT

## (undated) DEVICE — AGENT HEMSTAT 3GM OXIDIZED REGENERATED CELOS ABSRB FOR CONT (ORDER MULTIPLES OF 5EA)

## (undated) DEVICE — GLOVE SURG SZ 65 CRM LTX FREE POLYISOPRENE POLYMER BEAD ANTI

## (undated) DEVICE — TRAP SPEC COLL 40CC MUCOUS CALIB UNIV CONN FOR OPN SUCT

## (undated) DEVICE — TOWEL SURG W17XL27IN BLU COT DLX PREWASHED DELINTED 8 PER PK

## (undated) DEVICE — MASK O2 ADULT POM PROCEDURAL OXYGEN MASK 7FT O2 TUBING 10FT

## (undated) DEVICE — ADHESIVE SKIN CLOSURE TOP 0.8 CC PREM PUR LIQUIBAND RAPID LF

## (undated) DEVICE — SUTURE PDS + SZ 0 L27IN ABSRB VLT L26MM CT 2 1 2 CIR PDP334H

## (undated) DEVICE — GLOVE ORANGE PI 7   MSG9070

## (undated) DEVICE — SNARE COLD DIAMOND 10MM THIN

## (undated) DEVICE — TRAP FLUID

## (undated) DEVICE — SYSTEM SMK EVAC LAP TBNG FILTER HSNG BENT STYL PNK SEE CLR

## (undated) DEVICE — DRIVER NDL DIA8MM MEGA SUTURECUT ENDOWRIST

## (undated) DEVICE — DRAPE SURG IOBAN W17XL23IN FAB ANTIMIC GEN INCIS LNR FULL W HNDL

## (undated) DEVICE — TROCAR LAP L100MM DIA5MM ABD Z-THREAD 1ST ENTRY KII FIOS

## (undated) DEVICE — SUTURE VICRYL + SZ 2-0 L27IN ABSRB WHT SH 1/2 CIR TAPERCUT VCP417H

## (undated) DEVICE — SKIN SCRUB TRAY PREM WET VLY

## (undated) DEVICE — DRAPE INCIS W23XL23IN FAB ANTIMIC IOBAN 2

## (undated) DEVICE — SPONGE LAP W18XL18IN LOOP 7IN RADPQ PREWASHED DELINTED XRAY DETECTABLE 5 PER PACK

## (undated) DEVICE — OBTURATOR ROBOTIC DIA8MM STD OPT BLDELSS

## (undated) DEVICE — DRAPE UNDERBUTTOCK W33.5XL46IN STD N FEN REINF W/ FLD PCH

## (undated) DEVICE — GLOVE SURG SZ 7 L12IN FNGR THK79MIL GRN LTX FREE

## (undated) DEVICE — GARMENT COMPR M FOR 12-18IN CALF INTMIT SGL BLDR HEMO-FORCE

## (undated) DEVICE — LINER INCONT W7XL14IN PEACH POLYMER FLUF ADH WT CNTOUR DLX

## (undated) DEVICE — SCISSORS SURG DIA8MM MPLR CRV ENDOWRIST

## (undated) DEVICE — APPLICATOR MEDICATED 26 CC SOLUTION HI LT ORNG CHLORAPREP

## (undated) DEVICE — SOLUTION ANTIFOG VIS SYS CLEARIFY LAPSCP

## (undated) DEVICE — COVER TIP DIA8MM DA VINCI SI XI X ENDOWRIST

## (undated) DEVICE — DRAPE ARM W21XH19XL10.5IN DA VINCI XI INTUITIVE SURGICAL

## (undated) DEVICE — SEAL UNIVERSAL 5-12MM

## (undated) DEVICE — GLOVE SURG SZ 8 L12IN FNGR THK79MIL GRN LTX FREE

## (undated) DEVICE — NEEDLE INSUF L150MM OD13GA DISP VERES

## (undated) DEVICE — FORCEPS SURG DIA8MM BPLR FEN ENDOWRIST

## (undated) DEVICE — PUMP SUC IRR TBNG L10FT W/ HNDPC ASSEMB STRYKEFLOW 2

## (undated) DEVICE — TOWEL MSG G N WVN STP FLAG

## (undated) DEVICE — SUTURE MONOCRYL + SZ 4-0 L18IN ABSRB UD L19MM PS-2 3/8 CIR MCP496G

## (undated) DEVICE — FOLEY TRAY 1 LAYR 16 FR 10 CC URIMTR SNAPSECURE URO175816S

## (undated) DEVICE — SPONGE LAP REG 18X18IN

## (undated) DEVICE — FORCEPS SURG DIA8MM PROGRASP ENDOSWITCH

## (undated) DEVICE — GOWN SURG XL L47IN BLU POLY REINF BRTH FLM SL HK LOOP CLSR

## (undated) DEVICE — COVER TBL W79XL90IN HVY DUTY REINF FAN FLD DISPOSABLE

## (undated) DEVICE — TRAP SPEC RETRV CLR PLAS POLYP IN LN SUCT QUIK CTCH

## (undated) DEVICE — PROTECTOR EYE AD FOAM RIG IGUARD